# Patient Record
Sex: FEMALE | Race: WHITE | NOT HISPANIC OR LATINO
[De-identification: names, ages, dates, MRNs, and addresses within clinical notes are randomized per-mention and may not be internally consistent; named-entity substitution may affect disease eponyms.]

---

## 2017-03-17 ENCOUNTER — APPOINTMENT (OUTPATIENT)
Dept: CARDIOLOGY | Facility: CLINIC | Age: 72
End: 2017-03-17

## 2017-04-03 ENCOUNTER — APPOINTMENT (OUTPATIENT)
Dept: CARDIOLOGY | Facility: CLINIC | Age: 72
End: 2017-04-03

## 2017-04-10 ENCOUNTER — APPOINTMENT (OUTPATIENT)
Dept: CARDIOLOGY | Facility: CLINIC | Age: 72
End: 2017-04-10

## 2017-04-24 ENCOUNTER — APPOINTMENT (OUTPATIENT)
Dept: CARDIOLOGY | Facility: CLINIC | Age: 72
End: 2017-04-24

## 2017-05-08 ENCOUNTER — APPOINTMENT (OUTPATIENT)
Dept: CARDIOLOGY | Facility: CLINIC | Age: 72
End: 2017-05-08

## 2017-12-08 ENCOUNTER — RX RENEWAL (OUTPATIENT)
Age: 72
End: 2017-12-08

## 2017-12-11 ENCOUNTER — RX RENEWAL (OUTPATIENT)
Age: 72
End: 2017-12-11

## 2017-12-13 ENCOUNTER — RX RENEWAL (OUTPATIENT)
Age: 72
End: 2017-12-13

## 2017-12-28 ENCOUNTER — APPOINTMENT (OUTPATIENT)
Dept: CARDIOLOGY | Facility: CLINIC | Age: 72
End: 2017-12-28
Payer: MEDICARE

## 2017-12-28 ENCOUNTER — NON-APPOINTMENT (OUTPATIENT)
Age: 72
End: 2017-12-28

## 2017-12-28 VITALS
HEART RATE: 54 BPM | HEIGHT: 62 IN | BODY MASS INDEX: 20.06 KG/M2 | OXYGEN SATURATION: 100 % | DIASTOLIC BLOOD PRESSURE: 54 MMHG | SYSTOLIC BLOOD PRESSURE: 150 MMHG | WEIGHT: 109 LBS

## 2017-12-28 DIAGNOSIS — Z87.891 PERSONAL HISTORY OF NICOTINE DEPENDENCE: ICD-10-CM

## 2017-12-28 DIAGNOSIS — Z80.0 FAMILY HISTORY OF MALIGNANT NEOPLASM OF DIGESTIVE ORGANS: ICD-10-CM

## 2017-12-28 PROCEDURE — 99214 OFFICE O/P EST MOD 30 MIN: CPT

## 2017-12-28 PROCEDURE — 93000 ELECTROCARDIOGRAM COMPLETE: CPT

## 2018-01-03 ENCOUNTER — MEDICATION RENEWAL (OUTPATIENT)
Age: 73
End: 2018-01-03

## 2018-02-26 ENCOUNTER — RX RENEWAL (OUTPATIENT)
Age: 73
End: 2018-02-26

## 2018-03-02 ENCOUNTER — MEDICATION RENEWAL (OUTPATIENT)
Age: 73
End: 2018-03-02

## 2018-03-02 DIAGNOSIS — Z86.39 PERSONAL HISTORY OF OTHER ENDOCRINE, NUTRITIONAL AND METABOLIC DISEASE: ICD-10-CM

## 2018-04-03 ENCOUNTER — APPOINTMENT (OUTPATIENT)
Dept: CARDIOLOGY | Facility: CLINIC | Age: 73
End: 2018-04-03
Payer: MEDICARE

## 2018-04-03 PROCEDURE — 93306 TTE W/DOPPLER COMPLETE: CPT

## 2018-04-06 ENCOUNTER — RX RENEWAL (OUTPATIENT)
Age: 73
End: 2018-04-06

## 2018-04-17 ENCOUNTER — APPOINTMENT (OUTPATIENT)
Dept: CARDIOLOGY | Facility: CLINIC | Age: 73
End: 2018-04-17
Payer: MEDICARE

## 2018-04-17 VITALS
SYSTOLIC BLOOD PRESSURE: 150 MMHG | WEIGHT: 110 LBS | HEIGHT: 62 IN | HEART RATE: 78 BPM | BODY MASS INDEX: 20.24 KG/M2 | DIASTOLIC BLOOD PRESSURE: 70 MMHG

## 2018-04-17 PROCEDURE — 99214 OFFICE O/P EST MOD 30 MIN: CPT

## 2018-04-19 ENCOUNTER — RX RENEWAL (OUTPATIENT)
Age: 73
End: 2018-04-19

## 2018-05-07 ENCOUNTER — APPOINTMENT (OUTPATIENT)
Dept: CARDIOLOGY | Facility: CLINIC | Age: 73
End: 2018-05-07

## 2018-05-21 ENCOUNTER — RX RENEWAL (OUTPATIENT)
Age: 73
End: 2018-05-21

## 2018-05-29 ENCOUNTER — RX RENEWAL (OUTPATIENT)
Age: 73
End: 2018-05-29

## 2018-06-20 ENCOUNTER — RX RENEWAL (OUTPATIENT)
Age: 73
End: 2018-06-20

## 2018-08-20 ENCOUNTER — MEDICATION RENEWAL (OUTPATIENT)
Age: 73
End: 2018-08-20

## 2018-08-20 ENCOUNTER — RX RENEWAL (OUTPATIENT)
Age: 73
End: 2018-08-20

## 2018-08-27 RX ORDER — HYDROCHLOROTHIAZIDE 25 MG/1
25 TABLET ORAL DAILY
Qty: 90 | Refills: 1 | Status: DISCONTINUED | COMMUNITY
End: 2018-08-27

## 2018-10-10 ENCOUNTER — APPOINTMENT (OUTPATIENT)
Dept: CARDIOLOGY | Facility: CLINIC | Age: 73
End: 2018-10-10
Payer: MEDICARE

## 2018-10-10 PROCEDURE — 93306 TTE W/DOPPLER COMPLETE: CPT

## 2018-10-16 ENCOUNTER — APPOINTMENT (OUTPATIENT)
Dept: CARDIOLOGY | Facility: CLINIC | Age: 73
End: 2018-10-16
Payer: MEDICARE

## 2018-10-16 VITALS
HEIGHT: 62 IN | OXYGEN SATURATION: 97 % | SYSTOLIC BLOOD PRESSURE: 120 MMHG | HEART RATE: 58 BPM | DIASTOLIC BLOOD PRESSURE: 62 MMHG | BODY MASS INDEX: 20.98 KG/M2 | WEIGHT: 114 LBS

## 2018-10-16 PROCEDURE — 99214 OFFICE O/P EST MOD 30 MIN: CPT

## 2018-12-17 ENCOUNTER — RX RENEWAL (OUTPATIENT)
Age: 73
End: 2018-12-17

## 2019-02-25 ENCOUNTER — MEDICATION RENEWAL (OUTPATIENT)
Age: 74
End: 2019-02-25

## 2019-04-19 ENCOUNTER — APPOINTMENT (OUTPATIENT)
Dept: CARDIOLOGY | Facility: CLINIC | Age: 74
End: 2019-04-19
Payer: MEDICARE

## 2019-04-19 ENCOUNTER — NON-APPOINTMENT (OUTPATIENT)
Age: 74
End: 2019-04-19

## 2019-04-19 VITALS
OXYGEN SATURATION: 99 % | DIASTOLIC BLOOD PRESSURE: 60 MMHG | SYSTOLIC BLOOD PRESSURE: 132 MMHG | WEIGHT: 111 LBS | BODY MASS INDEX: 20.96 KG/M2 | HEART RATE: 55 BPM | HEIGHT: 61 IN

## 2019-04-19 PROCEDURE — 93306 TTE W/DOPPLER COMPLETE: CPT

## 2019-04-19 PROCEDURE — 99215 OFFICE O/P EST HI 40 MIN: CPT

## 2019-04-19 PROCEDURE — 93000 ELECTROCARDIOGRAM COMPLETE: CPT

## 2019-04-19 NOTE — PHYSICAL EXAM
[General Appearance - Well Developed] : well developed [Normal Appearance] : normal appearance [General Appearance - Well Nourished] : well nourished [Well Groomed] : well groomed [No Deformities] : no deformities [Normal Conjunctiva] : the conjunctiva exhibited no abnormalities [General Appearance - In No Acute Distress] : no acute distress [Eyelids - No Xanthelasma] : the eyelids demonstrated no xanthelasmas [Normal Oral Mucosa] : normal oral mucosa [No Oral Pallor] : no oral pallor [No Oral Cyanosis] : no oral cyanosis [Normal Jugular Venous A Waves Present] : normal jugular venous A waves present [Normal Jugular Venous V Waves Present] : normal jugular venous V waves present [No Jugular Venous Cronin A Waves] : no jugular venous cronin A waves [Respiration, Rhythm And Depth] : normal respiratory rhythm and effort [Auscultation Breath Sounds / Voice Sounds] : lungs were clear to auscultation bilaterally [Exaggerated Use Of Accessory Muscles For Inspiration] : no accessory muscle use [Heart Rate And Rhythm] : heart rate and rhythm were normal [Heart Sounds] : normal S1 and S2 [Murmurs] : no murmurs present [Abdomen Soft] : soft [Abdomen Mass (___ Cm)] : no abdominal mass palpated [Abdomen Tenderness] : non-tender [Gait - Sufficient For Exercise Testing] : the gait was sufficient for exercise testing [Abnormal Walk] : normal gait [Nail Clubbing] : no clubbing of the fingernails [Cyanosis, Localized] : no localized cyanosis [Petechial Hemorrhages (___cm)] : no petechial hemorrhages [] : no rash [Skin Color & Pigmentation] : normal skin color and pigmentation [No Venous Stasis] : no venous stasis [Skin Lesions] : no skin lesions [No Skin Ulcers] : no skin ulcer [No Xanthoma] : no  xanthoma was observed [Mood] : the mood was normal [Oriented To Time, Place, And Person] : oriented to person, place, and time [Affect] : the affect was normal [No Anxiety] : not feeling anxious

## 2019-04-19 NOTE — HISTORY OF PRESENT ILLNESS
[FreeTextEntry1] : Patient is presenting today for followup visit.  Patient has a history of ascending aortic aneurysm which has been followed on a regular basis and has been stable. Patient has a history of mild aortic stenosis. She was evaluated by neurology for possible TIA. She was started on low dose daily aspirin and statin. She was also prescribed antianxiety medication. She has been doing clinically very well. No further symptoms. She is physically active. No chest pains or shortness of breath.

## 2019-04-19 NOTE — ASSESSMENT
[FreeTextEntry1] : Ascending aortic aneurysm 4.8 cm. Echo done today, results of echocardiogram discussed. Aortic aneurysm measuring 4.7 cm. Bicuspid aortic valve with likely progression of aortic insufficiency estimated to be severe. Patient denies any symptoms. She is coughing on a regular basis. No shortness of breath. I suggest that we should do transesophageal echocardiogram for accurate estimation of aortic insufficiency and size of the ascending aorta. I also advised that she will meet with cardiothoracic surgeon in anticipation of aneurysm repair and aortic valve replacement in the near future. Considering her body size and bicuspid aortic valve she likely will need aneurysm repair sooner than later. Appointment with Dr. Herrera will be made for consultation regarding aortic insufficiency and ascending aortic aneurysm.\par History TIA. Patient was started on baby aspirin by neurology. She had no further neurological symptoms. Patient was likely dehydrated. She is not hydrating enough. Increase hydration discussed with the patient\par Hypertension well-controlled\par FU after RYDER

## 2019-04-19 NOTE — REASON FOR VISIT
[Follow-Up - Clinic] : a clinic follow-up of [Hypertension] : hypertension [FreeTextEntry1] : aneurysm

## 2019-05-08 ENCOUNTER — OUTPATIENT (OUTPATIENT)
Dept: OUTPATIENT SERVICES | Facility: HOSPITAL | Age: 74
LOS: 1 days | End: 2019-05-08
Payer: MEDICARE

## 2019-05-08 PROCEDURE — 93312 ECHO TRANSESOPHAGEAL: CPT | Mod: 26

## 2019-05-08 PROCEDURE — 93320 DOPPLER ECHO COMPLETE: CPT | Mod: 26

## 2019-05-28 ENCOUNTER — RX RENEWAL (OUTPATIENT)
Age: 74
End: 2019-05-28

## 2019-06-17 ENCOUNTER — RX RENEWAL (OUTPATIENT)
Age: 74
End: 2019-06-17

## 2019-06-24 ENCOUNTER — APPOINTMENT (OUTPATIENT)
Dept: CARDIOTHORACIC SURGERY | Facility: CLINIC | Age: 74
End: 2019-06-24
Payer: MEDICARE

## 2019-06-24 VITALS
HEART RATE: 58 BPM | SYSTOLIC BLOOD PRESSURE: 150 MMHG | OXYGEN SATURATION: 98 % | DIASTOLIC BLOOD PRESSURE: 70 MMHG | BODY MASS INDEX: 20.61 KG/M2 | HEIGHT: 62 IN | WEIGHT: 112 LBS

## 2019-06-24 PROCEDURE — 99205 OFFICE O/P NEW HI 60 MIN: CPT

## 2019-06-24 NOTE — ASSESSMENT
[FreeTextEntry1] : Ms. Mcrae is a 73 year old female with severe symptomatic aortic insufficiency. she will need a CT scan of the chest as well a left heart cath with aortic root injection. I discussed with her Bental procedure and valve replacement and will meet with her after the testing to further discuss. \par \par \par I thank you for the opportunity to participate in the care of your patients. Please do not hesitate to contact me should you have any questions.\par

## 2019-06-24 NOTE — HISTORY OF PRESENT ILLNESS
[FreeTextEntry1] : Ms. CASPER is a 73 year old female referred by Dr. Melendez who presents for consultation. Her past medical history includes ascending aortic aneurysm (4.8-5 cm), HTN, HLD, mitral insufficiency, and severe aortic insufficiency.\par \par A RYDER was obtained at [facility] on May 8, 2019 demonstrating a bicuspid aortic valve ascending aorta measures 4.7-4.8 cm, LVEF 60%.\par \par \par

## 2019-06-24 NOTE — PHYSICAL EXAM
[General Appearance - Alert] : alert [General Appearance - In No Acute Distress] : in no acute distress [Sclera] : the sclera and conjunctiva were normal [Outer Ear] : the ears and nose were normal in appearance [Neck Appearance] : the appearance of the neck was normal [] : no respiratory distress [Exaggerated Use Of Accessory Muscles For Inspiration] : no accessory muscle use [Apical Impulse] : the apical impulse was normal [Heart Sounds] : normal S1 and S2 [FreeTextEntry1] : IV/VI systolic murmur at the right second intercostal space [Examination Of The Chest] : the chest was normal in appearance [Arterial Pulses Carotid] : carotid pulses were normal with no bruits [Arterial Pulses Femoral] : femoral pulses were normal without bruits [Abdomen Soft] : soft [Bowel Sounds] : normal bowel sounds [Cervical Lymph Nodes Enlarged Posterior Bilaterally] : posterior cervical [No CVA Tenderness] : no ~M costovertebral angle tenderness [Nail Clubbing] : no clubbing  or cyanosis of the fingernails [Abnormal Walk] : normal gait [Skin Color & Pigmentation] : normal skin color and pigmentation [Cranial Nerves] : cranial nerves 2-12 were intact [Sensation] : the sensory exam was normal to light touch and pinprick [Oriented To Time, Place, And Person] : oriented to person, place, and time [Affect] : the affect was normal

## 2019-06-28 ENCOUNTER — APPOINTMENT (OUTPATIENT)
Dept: CARDIOLOGY | Facility: CLINIC | Age: 74
End: 2019-06-28

## 2019-07-19 ENCOUNTER — OUTPATIENT (OUTPATIENT)
Dept: OUTPATIENT SERVICES | Facility: HOSPITAL | Age: 74
LOS: 1 days | End: 2019-07-19
Payer: MEDICARE

## 2019-07-19 VITALS
TEMPERATURE: 98 F | DIASTOLIC BLOOD PRESSURE: 77 MMHG | HEART RATE: 57 BPM | WEIGHT: 111.99 LBS | RESPIRATION RATE: 18 BRPM | HEIGHT: 62 IN | SYSTOLIC BLOOD PRESSURE: 188 MMHG | OXYGEN SATURATION: 98 %

## 2019-07-19 VITALS — WEIGHT: 111.99 LBS | HEIGHT: 62 IN

## 2019-07-19 DIAGNOSIS — Z98.890 OTHER SPECIFIED POSTPROCEDURAL STATES: Chronic | ICD-10-CM

## 2019-07-19 DIAGNOSIS — Z01.810 ENCOUNTER FOR PREPROCEDURAL CARDIOVASCULAR EXAMINATION: ICD-10-CM

## 2019-07-19 LAB
ANION GAP SERPL CALC-SCNC: 10 MMOL/L — SIGNIFICANT CHANGE UP (ref 5–17)
APTT BLD: 32.7 SEC — SIGNIFICANT CHANGE UP (ref 27.5–36.3)
BASOPHILS # BLD AUTO: 0.03 K/UL — SIGNIFICANT CHANGE UP (ref 0–0.2)
BASOPHILS NFR BLD AUTO: 0.6 % — SIGNIFICANT CHANGE UP (ref 0–2)
BUN SERPL-MCNC: 22 MG/DL — HIGH (ref 8–20)
CALCIUM SERPL-MCNC: 9.8 MG/DL — SIGNIFICANT CHANGE UP (ref 8.6–10.2)
CHLORIDE SERPL-SCNC: 104 MMOL/L — SIGNIFICANT CHANGE UP (ref 98–107)
CO2 SERPL-SCNC: 29 MMOL/L — SIGNIFICANT CHANGE UP (ref 22–29)
CREAT SERPL-MCNC: 0.69 MG/DL — SIGNIFICANT CHANGE UP (ref 0.5–1.3)
EOSINOPHIL # BLD AUTO: 0.05 K/UL — SIGNIFICANT CHANGE UP (ref 0–0.5)
EOSINOPHIL NFR BLD AUTO: 1 % — SIGNIFICANT CHANGE UP (ref 0–6)
GLUCOSE SERPL-MCNC: 88 MG/DL — SIGNIFICANT CHANGE UP (ref 70–115)
HCT VFR BLD CALC: 36.1 % — SIGNIFICANT CHANGE UP (ref 34.5–45)
HGB BLD-MCNC: 12 G/DL — SIGNIFICANT CHANGE UP (ref 11.5–15.5)
IMM GRANULOCYTES NFR BLD AUTO: 0.2 % — SIGNIFICANT CHANGE UP (ref 0–1.5)
INR BLD: 0.97 RATIO — SIGNIFICANT CHANGE UP (ref 0.88–1.16)
LYMPHOCYTES # BLD AUTO: 1.3 K/UL — SIGNIFICANT CHANGE UP (ref 1–3.3)
LYMPHOCYTES # BLD AUTO: 25.3 % — SIGNIFICANT CHANGE UP (ref 13–44)
MCHC RBC-ENTMCNC: 30.7 PG — SIGNIFICANT CHANGE UP (ref 27–34)
MCHC RBC-ENTMCNC: 33.2 GM/DL — SIGNIFICANT CHANGE UP (ref 32–36)
MCV RBC AUTO: 92.3 FL — SIGNIFICANT CHANGE UP (ref 80–100)
MONOCYTES # BLD AUTO: 0.52 K/UL — SIGNIFICANT CHANGE UP (ref 0–0.9)
MONOCYTES NFR BLD AUTO: 10.1 % — SIGNIFICANT CHANGE UP (ref 2–14)
NEUTROPHILS # BLD AUTO: 3.23 K/UL — SIGNIFICANT CHANGE UP (ref 1.8–7.4)
NEUTROPHILS NFR BLD AUTO: 62.8 % — SIGNIFICANT CHANGE UP (ref 43–77)
PLATELET # BLD AUTO: 222 K/UL — SIGNIFICANT CHANGE UP (ref 150–400)
POTASSIUM SERPL-MCNC: 4.1 MMOL/L — SIGNIFICANT CHANGE UP (ref 3.5–5.3)
POTASSIUM SERPL-SCNC: 4.1 MMOL/L — SIGNIFICANT CHANGE UP (ref 3.5–5.3)
PROTHROM AB SERPL-ACNC: 11.2 SEC — SIGNIFICANT CHANGE UP (ref 10–12.9)
RBC # BLD: 3.91 M/UL — SIGNIFICANT CHANGE UP (ref 3.8–5.2)
RBC # FLD: 12.3 % — SIGNIFICANT CHANGE UP (ref 10.3–14.5)
SODIUM SERPL-SCNC: 143 MMOL/L — SIGNIFICANT CHANGE UP (ref 135–145)
WBC # BLD: 5.14 K/UL — SIGNIFICANT CHANGE UP (ref 3.8–10.5)
WBC # FLD AUTO: 5.14 K/UL — SIGNIFICANT CHANGE UP (ref 3.8–10.5)

## 2019-07-19 PROCEDURE — 80048 BASIC METABOLIC PNL TOTAL CA: CPT

## 2019-07-19 PROCEDURE — 36415 COLL VENOUS BLD VENIPUNCTURE: CPT

## 2019-07-19 PROCEDURE — 93005 ELECTROCARDIOGRAM TRACING: CPT

## 2019-07-19 PROCEDURE — 85610 PROTHROMBIN TIME: CPT

## 2019-07-19 PROCEDURE — 93010 ELECTROCARDIOGRAM REPORT: CPT

## 2019-07-19 PROCEDURE — G0463: CPT

## 2019-07-19 PROCEDURE — 85027 COMPLETE CBC AUTOMATED: CPT

## 2019-07-19 PROCEDURE — 85730 THROMBOPLASTIN TIME PARTIAL: CPT

## 2019-07-19 NOTE — H&P PST ADULT - ASSESSMENT
73 year old female with history of thoracic aneurysm, and bicuspid aortic valve for C in anticipation of having CVS.     	  Plan: PRE-PROCEDURE ASSESSMENT    -Patient seen and examined  -Labs reviewed  -Pre-procedure teaching completed with patient   -Questions answered about patients concerns     -instructed to NPO after midnight.   - Pt instructed to have escort to and from procedure   -pt instructed IF STENT PLACED WILL REQUIRE TO STAY OVERNIGHT FOR MONITORING AND DISCHARGED IN THE AM .

## 2019-07-19 NOTE — ASU PATIENT PROFILE, ADULT - PMH
Aortic aneurysm    Hyperlipidemia    Hypertension    Hypothyroidism    Nonrheumatic aortic valve insufficiency    Nonrheumatic mitral (valve) insufficiency

## 2019-07-19 NOTE — H&P PST ADULT - HISTORY OF PRESENT ILLNESS
This is a 73 year old female with longstanding relationship with Dr Zamora . She has been being followed for thoracic aneurysm as well as Bicuspid Aortic valve. She states she has been free of symptoms and remains active playing 18 holes of walking golf 1-2 times a week.  upon routine office visit her Echo revealed worsening of the aortic root as well as gradient. AORTIC ROOT 3.7CM, DILATED ASCENDING AORTA 4.7CM, TRANSAORTIC GRADIENT 29MMHG PEAK. ASCENDING AORTA IS 4.8CM .  She was referred to Dr Herrera for consultation for repair of valve as well as aneurysm.    she presents today for [facility] in anticipation for CVS.      mallampati 2   asa 2  BRA 0.9

## 2019-07-19 NOTE — H&P PST ADULT - NSICDXPASTMEDICALHX_GEN_ALL_CORE_FT
PAST MEDICAL HISTORY:  Aortic aneurysm     Hyperlipidemia     Hypertension     Hypothyroidism     Nonrheumatic aortic valve insufficiency     Nonrheumatic mitral (valve) insufficiency

## 2019-07-23 PROBLEM — I34.0 NONRHEUMATIC MITRAL (VALVE) INSUFFICIENCY: Chronic | Status: ACTIVE | Noted: 2019-07-19

## 2019-07-23 PROBLEM — I35.1 NONRHEUMATIC AORTIC (VALVE) INSUFFICIENCY: Chronic | Status: ACTIVE | Noted: 2019-07-19

## 2019-07-23 PROBLEM — E78.5 HYPERLIPIDEMIA, UNSPECIFIED: Chronic | Status: ACTIVE | Noted: 2019-07-19

## 2019-07-23 PROBLEM — I71.9 AORTIC ANEURYSM OF UNSPECIFIED SITE, WITHOUT RUPTURE: Chronic | Status: ACTIVE | Noted: 2019-07-19

## 2019-07-23 PROBLEM — E03.9 HYPOTHYROIDISM, UNSPECIFIED: Chronic | Status: ACTIVE | Noted: 2019-07-19

## 2019-07-23 PROBLEM — I10 ESSENTIAL (PRIMARY) HYPERTENSION: Chronic | Status: ACTIVE | Noted: 2019-07-19

## 2019-07-26 ENCOUNTER — TRANSCRIPTION ENCOUNTER (OUTPATIENT)
Age: 74
End: 2019-07-26

## 2019-07-26 ENCOUNTER — OUTPATIENT (OUTPATIENT)
Dept: OUTPATIENT SERVICES | Facility: HOSPITAL | Age: 74
LOS: 1 days | Discharge: HOME | End: 2019-07-26
Payer: MEDICARE

## 2019-07-26 VITALS
HEART RATE: 54 BPM | HEIGHT: 63 IN | TEMPERATURE: 98 F | RESPIRATION RATE: 16 BRPM | OXYGEN SATURATION: 98 % | DIASTOLIC BLOOD PRESSURE: 78 MMHG | SYSTOLIC BLOOD PRESSURE: 175 MMHG | WEIGHT: 111.99 LBS

## 2019-07-26 VITALS
RESPIRATION RATE: 16 BRPM | OXYGEN SATURATION: 97 % | SYSTOLIC BLOOD PRESSURE: 151 MMHG | DIASTOLIC BLOOD PRESSURE: 77 MMHG | HEART RATE: 52 BPM

## 2019-07-26 DIAGNOSIS — I71.9 AORTIC ANEURYSM OF UNSPECIFIED SITE, WITHOUT RUPTURE: ICD-10-CM

## 2019-07-26 DIAGNOSIS — Z98.890 OTHER SPECIFIED POSTPROCEDURAL STATES: Chronic | ICD-10-CM

## 2019-07-26 PROCEDURE — 93454 CORONARY ARTERY ANGIO S&I: CPT | Mod: 26

## 2019-07-26 PROCEDURE — 99153 MOD SED SAME PHYS/QHP EA: CPT

## 2019-07-26 PROCEDURE — 93567 NJX CAR CTH SPRVLV AORTGRPHY: CPT

## 2019-07-26 PROCEDURE — 93454 CORONARY ARTERY ANGIO S&I: CPT

## 2019-07-26 PROCEDURE — 99152 MOD SED SAME PHYS/QHP 5/>YRS: CPT

## 2019-07-26 PROCEDURE — C1769: CPT

## 2019-07-26 PROCEDURE — C1894: CPT

## 2019-07-26 PROCEDURE — C1887: CPT

## 2019-07-26 NOTE — DISCHARGE NOTE PROVIDER - HOSPITAL COURSE
Department of Cardiology                                                                    [facility]/[place] [address] [address]                                                              Telephone: [phone]. Fax:[phone]                                                                               Cardiac Catheterization Note             Narrative:    73y  Female is now s/p left heart catheterization via non obstructive CAD Severe AI and ascending aorta aneurysm 4.7 .                PAST MEDICAL & SURGICAL HISTORY:    Hypothyroidism    Nonrheumatic mitral (valve) insufficiency    Nonrheumatic aortic valve insufficiency    Hyperlipidemia    Hypertension    Aortic aneurysm    S/P hernia repair        FAMILY HISTORY:        Home Medications:    amLODIPine 5 mg oral tablet: 1 tab(s) orally once a day (26 Jul 2019 07:20)    aspirin 81 mg oral tablet: 1 tab(s) orally once a day (26 Jul 2019 07:20)    hydroCHLOROthiazide 25 mg oral tablet: orally every other day (26 Jul 2019 07:20)    levothyroxine 75 mcg (0.075 mg) oral tablet: 1 tab(s) orally once a day (26 Jul 2019 07:20)    Lexapro 10 mg oral tablet: 1 tab(s) orally once a day (26 Jul 2019 07:20)    quinapril 20 mg oral tablet: 1 tab(s) orally once a day (26 Jul 2019 07:20)    raNITIdine 150 mg oral capsule: 1 cap(s) orally 2 times a day (26 Jul 2019 07:20)    simvastatin 20 mg oral tablet: 1 tab(s) orally once a day (at bedtime) (26 Jul 2019 07:20)                            General: No fatigue, no fevers/chills    Respiratory: No dyspnea, no cough, no wheeze    CV: No chest pain, no palpitations    Abd: No nausea    Neuro: No headache, no dizziness    codeine (Vomiting)    No Known Allergies            Objective:    Vital Signs Last 24 Hrs    T(C): 36.5 (26 Jul 2019 07:37), Max: 36.5 (26 Jul 2019 07:37)    T(F): 97.7 (26 Jul 2019 07:37), Max: 97.7 (26 Jul 2019 07:37)    HR: 58 (26 Jul 2019 10:45) (52 - 58)    BP: 156/70 (26 Jul 2019 10:45) (144/67 - 185/77)    BP(mean): --    RR: 16 (26 Jul 2019 10:45) (15 - 16)    SpO2: 98% (26 Jul 2019 10:45) (95% - 98%)        CM: SR    Neuro: A&OX3, CN 2-12 intact    HEENT: NC, AT    Lungs: CTA B/L    CV: S1, S2, no murmur, RRR    Abd: Soft    Right radial : Soft, no bleeding, no hematoma    Extremity: + distal pulses                            PLAN     -post cardiac cath discharge orders    -radial site precautions.     -continue current medical therapy    -follow up with Dr Herrera.     -follow up outpt in 2 weeks

## 2019-07-26 NOTE — PROGRESS NOTE ADULT - SUBJECTIVE AND OBJECTIVE BOX
Department of Cardiology                                                                  [facility]/[place] [address] [id number]                                                            Telephone: [phone]. Fax:[phone]    Pre Cath Note    73y Female     Planned Procedure: [facility]    Pertinent Prior Medications:        Antiplatelet: N/A       Aspirin: 81 mg daily       Statin: Zocor 20 mg daily       Beta Blocker: N/A       CCB: Amlodipine 5 mg daily       Other Antianginal: N/A       ACEI: Quinapril 20 mg daily       Diuretic: HCTZ 25 mg daily    Pre-Procedural Orders: N/A    ASA: 2  Mallampati: 2  CCS Class: 1  GFR: 88  Adjusted Bleeding Risk: 0.9%    HPI: This is a 73 year old female with longstanding relationship with Dr Zamora . She has been being followed for thoracic aneurysm as well as Bicuspid Aortic valve. She states she has been free of symptoms and remains active playing 18 holes of walking golf 1-2 times a week.  upon routine office visit her Echo revealed worsening of the aortic root as well as gradient. AORTIC ROOT 3.7CM, DILATED ASCENDING AORTA 4.7CM, TRANSAORTIC GRADIENT 29MMHG PEAK. ASCENDING AORTA IS 4.8CM .  She was referred to Dr Herrera for consultation for repair of valve as well as aneurysm.  She presents today for [facility] in anticipation for CVS.     Nuclear Stress Test: N/A    RYDER: 5/8/2019       LVSF: Normal       EF: 60%       RVSF: Normal       LA: Normal       RA: Normal, PFO with no shunt       Mitral Valve: Mild MR       Aortic Valve: Bicuspid valve with moderate eccentric AR       Aorta: Dilated ascending aorta (4.7-4.8 cm)       Tricuspid Valve: Mild TR       Pulmonic Valve: Normal       Pericardium: No effusion    Allergies: No Known Allergies  Intolerances: codeine (Vomiting)    PAST MEDICAL & SURGICAL HISTORY:  Hypothyroidism  Nonrheumatic mitral (valve) insufficiency  Nonrheumatic aortic valve insufficiency  Hyperlipidemia  Hypertension  Aortic aneurysm  S/P hernia repair    Home Medications:  amLODIPine 5 mg oral tablet: 1 tab(s) orally once a day (26 Jul 2019 07:20)  aspirin 81 mg oral tablet: 1 tab(s) orally once a day (26 Jul 2019 07:20)  hydroCHLOROthiazide 25 mg oral tablet: orally every other day (26 Jul 2019 07:20)  levothyroxine 75 mcg (0.075 mg) oral tablet: 1 tab(s) orally once a day (26 Jul 2019 07:20)  Lexapro 10 mg oral tablet: 1 tab(s) orally once a day (26 Jul 2019 07:20)  quinapril 20 mg oral tablet: 1 tab(s) orally once a day (26 Jul 2019 07:20)  raNITIdine 150 mg oral capsule: 1 cap(s) orally 2 times a day (26 Jul 2019 07:20)  simvastatin 20 mg oral tablet: 1 tab(s) orally once a day (at bedtime) (26 Jul 2019 07:20)    Physical Examination:   General: Awake, alert, speech clear, no acute distress.  Neck: No bruit, normal jugular venous pressures  Chest: Clear CTA, S1, S2, no murmur, RRR  Extremities: No edema

## 2019-07-26 NOTE — DISCHARGE NOTE NURSING/CASE MANAGEMENT/SOCIAL WORK - NSDCDPATPORTLINK_GEN_ALL_CORE
You can access the [facility] Patient Portal, offered by [facility], by registering with the following website: [email or web address]

## 2019-07-26 NOTE — DISCHARGE NOTE PROVIDER - NSDCCPTREATMENT_GEN_ALL_CORE_FT
PRINCIPAL PROCEDURE  Procedure: Angiogram, coronary, with heart valve assessment  Findings and Treatment:

## 2019-07-26 NOTE — DISCHARGE NOTE PROVIDER - CARE PROVIDER_API CALL
Deng Herrera)  Surgery; Thoracic and Cardiac Surgery  [address] [place] [address]  Phone: [phone]  Fax: [phone]  Follow Up Time:

## 2019-08-02 ENCOUNTER — APPOINTMENT (OUTPATIENT)
Dept: CARDIOLOGY | Facility: CLINIC | Age: 74
End: 2019-08-02
Payer: MEDICARE

## 2019-08-02 VITALS
HEART RATE: 55 BPM | DIASTOLIC BLOOD PRESSURE: 62 MMHG | SYSTOLIC BLOOD PRESSURE: 134 MMHG | WEIGHT: 112 LBS | HEIGHT: 62 IN | OXYGEN SATURATION: 97 % | BODY MASS INDEX: 20.61 KG/M2

## 2019-08-02 PROCEDURE — 99215 OFFICE O/P EST HI 40 MIN: CPT

## 2019-08-02 RX ORDER — ESCITALOPRAM OXALATE 10 MG/1
10 TABLET, FILM COATED ORAL DAILY
Refills: 0 | Status: ACTIVE | COMMUNITY

## 2019-08-02 RX ORDER — LATANOPROST/PF 0.005 %
0.01 DROPS OPHTHALMIC (EYE)
Refills: 0 | Status: ACTIVE | COMMUNITY

## 2019-08-02 RX ORDER — LEVOTHYROXINE SODIUM 0.07 MG/1
75 TABLET ORAL
Refills: 0 | Status: ACTIVE | COMMUNITY

## 2019-08-02 NOTE — PHYSICAL EXAM
[General Appearance - Well Developed] : well developed [Well Groomed] : well groomed [Normal Appearance] : normal appearance [No Deformities] : no deformities [General Appearance - Well Nourished] : well nourished [General Appearance - In No Acute Distress] : no acute distress [Normal Conjunctiva] : the conjunctiva exhibited no abnormalities [Eyelids - No Xanthelasma] : the eyelids demonstrated no xanthelasmas [Normal Oral Mucosa] : normal oral mucosa [No Oral Pallor] : no oral pallor [No Oral Cyanosis] : no oral cyanosis [Normal Jugular Venous V Waves Present] : normal jugular venous V waves present [Normal Jugular Venous A Waves Present] : normal jugular venous A waves present [No Jugular Venous Cronin A Waves] : no jugular venous cronin A waves [Exaggerated Use Of Accessory Muscles For Inspiration] : no accessory muscle use [Respiration, Rhythm And Depth] : normal respiratory rhythm and effort [Auscultation Breath Sounds / Voice Sounds] : lungs were clear to auscultation bilaterally [Heart Rate And Rhythm] : heart rate and rhythm were normal [Heart Sounds] : normal S1 and S2 [Abdomen Soft] : soft [Murmurs] : no murmurs present [Abdomen Tenderness] : non-tender [Abnormal Walk] : normal gait [Abdomen Mass (___ Cm)] : no abdominal mass palpated [Gait - Sufficient For Exercise Testing] : the gait was sufficient for exercise testing [Nail Clubbing] : no clubbing of the fingernails [Petechial Hemorrhages (___cm)] : no petechial hemorrhages [Cyanosis, Localized] : no localized cyanosis [Skin Color & Pigmentation] : normal skin color and pigmentation [] : no rash [No Venous Stasis] : no venous stasis [Skin Lesions] : no skin lesions [No Skin Ulcers] : no skin ulcer [No Xanthoma] : no  xanthoma was observed [Oriented To Time, Place, And Person] : oriented to person, place, and time [Affect] : the affect was normal [No Anxiety] : not feeling anxious [Mood] : the mood was normal

## 2019-08-02 NOTE — ASSESSMENT
[FreeTextEntry1] : Ascending aortic aneurysm 4.8 cm. Echo done today, results of echocardiogram discussed. Aortic aneurysm measuring 4.7 cm. Bicuspid aortic valve with likely progression of aortic insufficiency estimated to be severe. Patient denies any symptoms. She is coughing on a regular basis. No shortness of breath. \par History TIA. Patient was started on baby aspirin by neurology. She had no further neurological symptoms. Patient was likely dehydrated. She is not hydrating enough. Increase hydration discussed with the patient\par Hypertension well-controlled. Chronic cough likely secondary to quinapril. We will hold quinapril and increase dose of amlodipine. Blood pressure recheck in a week.\par Patient was evaluated by cardiothoracic surgery. She had a cardiac catheterization done which revealed nonobstructive CAD, severe aortic insufficiency. Patient is scheduled to followup with cardiothoracic surgery next week in preparation for surgery.\par

## 2019-08-05 DIAGNOSIS — I35.1 NONRHEUMATIC AORTIC (VALVE) INSUFFICIENCY: ICD-10-CM

## 2019-08-06 ENCOUNTER — APPOINTMENT (OUTPATIENT)
Dept: CARDIOTHORACIC SURGERY | Facility: CLINIC | Age: 74
End: 2019-08-06
Payer: MEDICARE

## 2019-08-06 VITALS
HEIGHT: 62 IN | BODY MASS INDEX: 20.61 KG/M2 | WEIGHT: 112 LBS | OXYGEN SATURATION: 96 % | RESPIRATION RATE: 16 BRPM | DIASTOLIC BLOOD PRESSURE: 67 MMHG | HEART RATE: 66 BPM | SYSTOLIC BLOOD PRESSURE: 170 MMHG

## 2019-08-06 PROCEDURE — 99213 OFFICE O/P EST LOW 20 MIN: CPT

## 2019-08-06 RX ORDER — QUINAPRIL HYDROCHLORIDE 20 MG/1
20 TABLET, FILM COATED ORAL
Qty: 90 | Refills: 0 | Status: COMPLETED | COMMUNITY
Start: 2017-12-13 | End: 2019-08-06

## 2019-08-06 NOTE — PHYSICAL EXAM
[Sclera] : the sclera and conjunctiva were normal [Neck Appearance] : the appearance of the neck was normal [Exaggerated Use Of Accessory Muscles For Inspiration] : no accessory muscle use [Apical Impulse] : the apical impulse was normal [FreeTextEntry1] : IV/VI systolic murmur at the apex [Examination Of The Chest] : the chest was normal in appearance [Heart Sounds] : normal S1 and S2 [Bowel Sounds] : normal bowel sounds [Arterial Pulses Carotid] : carotid pulses were normal with no bruits [Arterial Pulses Femoral] : femoral pulses were normal without bruits [Abdomen Soft] : soft [Cervical Lymph Nodes Enlarged Posterior Bilaterally] : posterior cervical [Abnormal Walk] : normal gait [Nail Clubbing] : no clubbing  or cyanosis of the fingernails [No CVA Tenderness] : no ~M costovertebral angle tenderness [Skin Color & Pigmentation] : normal skin color and pigmentation [] : no rash [Cranial Nerves] : cranial nerves 2-12 were intact [Oriented To Time, Place, And Person] : oriented to person, place, and time [Sensation] : the sensory exam was normal to light touch and pinprick [Affect] : the affect was normal

## 2019-08-06 NOTE — ASSESSMENT
[FreeTextEntry1] : Ms. Mcrae is a 73 year old female with severe aortic insufficiency and aortic aneurysm of 4.7 cm with bicuspid aortic valve who is a candidate for Bentall procedure.  I explained the risks, benefits, and alternatives of surgery to the patient and family. They understand and agree to proceed. I thank you for the opportunity to participate in the care of your patients. Please do not hesitate to contact me should you have any questions.\par

## 2019-08-06 NOTE — CONSULT LETTER
[Dear  ___] : Dear  [unfilled], [Courtesy Letter:] : I had the pleasure of seeing your patient, [unfilled], in my office today. [Sincerely,] : Sincerely, [Consult Closing:] : Thank you very much for allowing me to participate in the care of this patient.  If you have any questions, please do not hesitate to contact me. [FreeTextEntry2] : Farrah Melendez MD [FreeTextEntry3] : Deng Herrera MD\par  of Cardiothoracic Surgery\par [facility]\par [address] [place] [address] \[place] [address]\par [phone]\par

## 2019-08-06 NOTE — HISTORY OF PRESENT ILLNESS
[FreeTextEntry1] : Ms. CASPER is a 73 year old female referred by Dr. Melendez who presents back to the office having obtained additional imaging requested. Her past medical history includes ascending aortic aneurysm (4.8 cm), HTN, HLD, and severe aortic insufficiency.\par \par Cardiac Cath on 8/1/19 revealed non-obstructive CAD with a 4.7cm ascending aortic aneurysm.\par \par CT Scan was performed on 7/1/19 demonstrating 4.4 cm at main pulmonary artery, 2.4 cm aortic annulus, 3.7 cm at Sinus of Valsalva.\par \par She has leg edema and NYHA class II heart failure with bicuspid aortic valve. \par \par \par

## 2019-08-06 NOTE — REVIEW OF SYSTEMS
[Lower Ext Edema] : lower extremity edema [Negative] : Heme/Lymph [Chest Pain] : no chest pain [Palpitations] : no palpitations [SOB on Exertion] : shortness of breath during exertion

## 2019-08-19 ENCOUNTER — RX RENEWAL (OUTPATIENT)
Age: 74
End: 2019-08-19

## 2019-08-27 ENCOUNTER — RX RENEWAL (OUTPATIENT)
Age: 74
End: 2019-08-27

## 2019-10-15 ENCOUNTER — APPOINTMENT (OUTPATIENT)
Dept: PULMONOLOGY | Facility: CLINIC | Age: 74
End: 2019-10-15
Payer: MEDICARE

## 2019-10-15 ENCOUNTER — OUTPATIENT (OUTPATIENT)
Dept: OUTPATIENT SERVICES | Facility: HOSPITAL | Age: 74
LOS: 1 days | End: 2019-10-15
Payer: MEDICARE

## 2019-10-15 VITALS
HEIGHT: 59 IN | DIASTOLIC BLOOD PRESSURE: 73 MMHG | WEIGHT: 114.64 LBS | RESPIRATION RATE: 18 BRPM | HEART RATE: 57 BPM | TEMPERATURE: 97 F | SYSTOLIC BLOOD PRESSURE: 161 MMHG

## 2019-10-15 DIAGNOSIS — Z98.890 OTHER SPECIFIED POSTPROCEDURAL STATES: Chronic | ICD-10-CM

## 2019-10-15 DIAGNOSIS — I35.0 NONRHEUMATIC AORTIC (VALVE) STENOSIS: ICD-10-CM

## 2019-10-15 DIAGNOSIS — Z29.9 ENCOUNTER FOR PROPHYLACTIC MEASURES, UNSPECIFIED: ICD-10-CM

## 2019-10-15 DIAGNOSIS — Z01.818 ENCOUNTER FOR OTHER PREPROCEDURAL EXAMINATION: ICD-10-CM

## 2019-10-15 DIAGNOSIS — I71.2 THORACIC AORTIC ANEURYSM, WITHOUT RUPTURE: ICD-10-CM

## 2019-10-15 LAB
ALBUMIN SERPL ELPH-MCNC: 4.4 G/DL — SIGNIFICANT CHANGE UP (ref 3.3–5.2)
ALP SERPL-CCNC: 80 U/L — SIGNIFICANT CHANGE UP (ref 40–120)
ALT FLD-CCNC: 18 U/L — SIGNIFICANT CHANGE UP
ANION GAP SERPL CALC-SCNC: 12 MMOL/L — SIGNIFICANT CHANGE UP (ref 5–17)
APPEARANCE UR: CLEAR — SIGNIFICANT CHANGE UP
APTT BLD: 33.3 SEC — SIGNIFICANT CHANGE UP (ref 27.5–36.3)
AST SERPL-CCNC: 32 U/L — HIGH
BASOPHILS # BLD AUTO: 0.04 K/UL — SIGNIFICANT CHANGE UP (ref 0–0.2)
BASOPHILS NFR BLD AUTO: 0.9 % — SIGNIFICANT CHANGE UP (ref 0–2)
BILIRUB SERPL-MCNC: 0.6 MG/DL — SIGNIFICANT CHANGE UP (ref 0.4–2)
BILIRUB UR-MCNC: NEGATIVE — SIGNIFICANT CHANGE UP
BLD GP AB SCN SERPL QL: SIGNIFICANT CHANGE UP
BUN SERPL-MCNC: 23 MG/DL — HIGH (ref 8–20)
CALCIUM SERPL-MCNC: 9 MG/DL — SIGNIFICANT CHANGE UP (ref 8.6–10.2)
CHLORIDE SERPL-SCNC: 101 MMOL/L — SIGNIFICANT CHANGE UP (ref 98–107)
CO2 SERPL-SCNC: 29 MMOL/L — SIGNIFICANT CHANGE UP (ref 22–29)
COLOR SPEC: YELLOW — SIGNIFICANT CHANGE UP
CREAT SERPL-MCNC: 0.57 MG/DL — SIGNIFICANT CHANGE UP (ref 0.5–1.3)
DIFF PNL FLD: NEGATIVE — SIGNIFICANT CHANGE UP
EOSINOPHIL # BLD AUTO: 0.07 K/UL — SIGNIFICANT CHANGE UP (ref 0–0.5)
EOSINOPHIL NFR BLD AUTO: 1.6 % — SIGNIFICANT CHANGE UP (ref 0–6)
GLUCOSE SERPL-MCNC: 88 MG/DL — SIGNIFICANT CHANGE UP (ref 70–115)
GLUCOSE UR QL: NEGATIVE MG/DL — SIGNIFICANT CHANGE UP
HBA1C BLD-MCNC: 5.2 % — SIGNIFICANT CHANGE UP (ref 4–5.6)
HCT VFR BLD CALC: 37.2 % — SIGNIFICANT CHANGE UP (ref 34.5–45)
HGB BLD-MCNC: 12.2 G/DL — SIGNIFICANT CHANGE UP (ref 11.5–15.5)
IMM GRANULOCYTES NFR BLD AUTO: 0.2 % — SIGNIFICANT CHANGE UP (ref 0–1.5)
INR BLD: 0.97 RATIO — SIGNIFICANT CHANGE UP (ref 0.88–1.16)
KETONES UR-MCNC: NEGATIVE — SIGNIFICANT CHANGE UP
LEUKOCYTE ESTERASE UR-ACNC: NEGATIVE — SIGNIFICANT CHANGE UP
LYMPHOCYTES # BLD AUTO: 1.43 K/UL — SIGNIFICANT CHANGE UP (ref 1–3.3)
LYMPHOCYTES # BLD AUTO: 32.2 % — SIGNIFICANT CHANGE UP (ref 13–44)
MCHC RBC-ENTMCNC: 31 PG — SIGNIFICANT CHANGE UP (ref 27–34)
MCHC RBC-ENTMCNC: 32.8 GM/DL — SIGNIFICANT CHANGE UP (ref 32–36)
MCV RBC AUTO: 94.7 FL — SIGNIFICANT CHANGE UP (ref 80–100)
MONOCYTES # BLD AUTO: 0.46 K/UL — SIGNIFICANT CHANGE UP (ref 0–0.9)
MONOCYTES NFR BLD AUTO: 10.4 % — SIGNIFICANT CHANGE UP (ref 2–14)
MRSA PCR RESULT.: SIGNIFICANT CHANGE UP
NEUTROPHILS # BLD AUTO: 2.43 K/UL — SIGNIFICANT CHANGE UP (ref 1.8–7.4)
NEUTROPHILS NFR BLD AUTO: 54.7 % — SIGNIFICANT CHANGE UP (ref 43–77)
NITRITE UR-MCNC: NEGATIVE — SIGNIFICANT CHANGE UP
NT-PROBNP SERPL-SCNC: 101 PG/ML — SIGNIFICANT CHANGE UP (ref 0–300)
PH UR: 7 — SIGNIFICANT CHANGE UP (ref 5–8)
PLATELET # BLD AUTO: 271 K/UL — SIGNIFICANT CHANGE UP (ref 150–400)
POTASSIUM SERPL-MCNC: 4 MMOL/L — SIGNIFICANT CHANGE UP (ref 3.5–5.3)
POTASSIUM SERPL-SCNC: 4 MMOL/L — SIGNIFICANT CHANGE UP (ref 3.5–5.3)
PREALB SERPL-MCNC: 22 MG/DL — SIGNIFICANT CHANGE UP (ref 18–38)
PROT SERPL-MCNC: 6.8 G/DL — SIGNIFICANT CHANGE UP (ref 6.6–8.7)
PROT UR-MCNC: NEGATIVE MG/DL — SIGNIFICANT CHANGE UP
PROTHROM AB SERPL-ACNC: 11.1 SEC — SIGNIFICANT CHANGE UP (ref 10–12.9)
RBC # BLD: 3.93 M/UL — SIGNIFICANT CHANGE UP (ref 3.8–5.2)
RBC # FLD: 12.5 % — SIGNIFICANT CHANGE UP (ref 10.3–14.5)
S AUREUS DNA NOSE QL NAA+PROBE: DETECTED
SODIUM SERPL-SCNC: 142 MMOL/L — SIGNIFICANT CHANGE UP (ref 135–145)
SP GR SPEC: 1.01 — SIGNIFICANT CHANGE UP (ref 1.01–1.02)
T3 SERPL-MCNC: 116 NG/DL — SIGNIFICANT CHANGE UP (ref 80–200)
T4 AB SER-ACNC: 10.4 UG/DL — SIGNIFICANT CHANGE UP (ref 4.5–12)
TSH SERPL-MCNC: 2.12 UIU/ML — SIGNIFICANT CHANGE UP (ref 0.27–4.2)
UROBILINOGEN FLD QL: NEGATIVE MG/DL — SIGNIFICANT CHANGE UP
WBC # BLD: 4.44 K/UL — SIGNIFICANT CHANGE UP (ref 3.8–10.5)
WBC # FLD AUTO: 4.44 K/UL — SIGNIFICANT CHANGE UP (ref 3.8–10.5)

## 2019-10-15 PROCEDURE — 93880 EXTRACRANIAL BILAT STUDY: CPT | Mod: 26

## 2019-10-15 PROCEDURE — 93880 EXTRACRANIAL BILAT STUDY: CPT

## 2019-10-15 PROCEDURE — 93005 ELECTROCARDIOGRAM TRACING: CPT

## 2019-10-15 PROCEDURE — 85018 HEMOGLOBIN: CPT | Mod: QW

## 2019-10-15 PROCEDURE — 94727 GAS DIL/WSHOT DETER LNG VOL: CPT

## 2019-10-15 PROCEDURE — 93306 TTE W/DOPPLER COMPLETE: CPT

## 2019-10-15 PROCEDURE — 94010 BREATHING CAPACITY TEST: CPT

## 2019-10-15 PROCEDURE — 93306 TTE W/DOPPLER COMPLETE: CPT | Mod: 26

## 2019-10-15 PROCEDURE — 93010 ELECTROCARDIOGRAM REPORT: CPT

## 2019-10-15 PROCEDURE — 71046 X-RAY EXAM CHEST 2 VIEWS: CPT | Mod: 26

## 2019-10-15 PROCEDURE — 71046 X-RAY EXAM CHEST 2 VIEWS: CPT

## 2019-10-15 PROCEDURE — G0463: CPT

## 2019-10-15 PROCEDURE — 94729 DIFFUSING CAPACITY: CPT

## 2019-10-15 RX ORDER — QUINAPRIL HYDROCHLORIDE 40 MG/1
1 TABLET, FILM COATED ORAL
Qty: 0 | Refills: 0 | DISCHARGE

## 2019-10-15 RX ORDER — AMLODIPINE BESYLATE 2.5 MG/1
1 TABLET ORAL
Qty: 0 | Refills: 0 | DISCHARGE

## 2019-10-15 RX ORDER — INFLUENZA VIRUS VACCINE 15; 15; 15; 15 UG/.5ML; UG/.5ML; UG/.5ML; UG/.5ML
0.5 SUSPENSION INTRAMUSCULAR ONCE
Refills: 0 | Status: DISCONTINUED | OUTPATIENT
Start: 2019-10-15 | End: 2019-10-22

## 2019-10-15 RX ORDER — CEFUROXIME AXETIL 250 MG
1500 TABLET ORAL ONCE
Refills: 0 | Status: DISCONTINUED | OUTPATIENT
Start: 2019-10-21 | End: 2019-10-21

## 2019-10-15 NOTE — H&P PST ADULT - NSICDXPROBLEM_GEN_ALL_CORE_FT
PROBLEM DIAGNOSES  Problem: Thoracic aortic aneurysm, without rupture  Assessment and Plan: Bentall by Dr. Herrera    Problem: Need for prophylactic measure  Assessment and Plan: moderate risk surgical team to determine prophyalctic intervention

## 2019-10-15 NOTE — H&P PST ADULT - HISTORY OF PRESENT ILLNESS
73 y/o female with medical hx of HTN, HLD, valve disease, hypothyroidism seen today pre-op for Bentall for thoracic aortic aneurysm without rupture. Pt report stable aortic aneurysm but recently witnessed progressively worsening symptoms of fatigue, tiredness,  intermittent cough and was on quinapril for HTN that was  discontinued and started on Amlodipine, intermittent chest palpitation. Pt denies SOB, dyspnea on exertion and peripheral edema. Seen for a scheduled surgery with Dr. Herrera.

## 2019-10-15 NOTE — H&P PST ADULT - LAB RESULTS AND INTERPRETATION
[facility] [place] [facility]'s [place] spoke to Alan.

## 2019-10-15 NOTE — H&P PST ADULT - NSANTHOSAYNRD_GEN_A_CORE
No. SHIKHA screening performed.  STOP BANG Legend: 0-2 = LOW Risk; 3-4 = INTERMEDIATE Risk; 5-8 = HIGH Risk

## 2019-10-15 NOTE — H&P PST ADULT - ASSESSMENT
75 y/o female with medical hx of HTN, HLD, valve disease, hypothyroidism seen today pre-op for Bentall for thoracic aortic aneurysm without rupture. Pt report stable aortic aneurysm but recently witnessed progressively worsening symptoms of fatigue, tiredness,  intermittent cough and was on quinapril for HTN that was  discontinued and started on Amlodipine, intermittent chest palpitation. Pt denies SOB, dyspnea on exertion, peripheral edema. Surgery protocol reviewed with pt today  CAPRINI VTE 2.0 SCORE [CLOT updated 2019]    AGE RELATED RISK FACTORS                                                       MOBILITY RELATED FACTORS  [ ] Age 41-60 years                                            (1 Point)                    [ ] Bed rest                                                        (1 Point)  [ x] Age: 61-74 years                                           (2 Points)                  [ ] Plaster cast                                                   (2 Points)  [ ] Age= 75 years                                              (3 Points)                    [ ] Bed bound for more than 72 hours                 (2 Points)    DISEASE RELATED RISK FACTORS                                               GENDER SPECIFIC FACTORS  [ ] Edema in the lower extremities                       (1 Point)              [ ] Pregnancy                                                     (1 Point)  [x ] Varicose veins                                               (1 Point)                     [ ] Post-partum < 6 weeks                                   (1 Point)             [ ] BMI > 25 Kg/m2                                            (1 Point)                     [ ] Hormonal therapy  or oral contraception          (1 Point)                 [ ] Sepsis (in the previous month)                        (1 Point)               [ ] History of pregnancy complications                 (1 point)  [ ] Pneumonia or serious lung disease                                               [ ] Unexplained or recurrent                     (1 Point)           (in the previous month)                               (1 Point)  [ ] Abnormal pulmonary function test                     (1 Point)                 SURGERY RELATED RISK FACTORS  [ ] Acute myocardial infarction                              (1 Point)               [ ]  Section                                             (1 Point)  [ ] Congestive heart failure (in the previous month)  (1 Point)      [ ] Minor surgery                                                  (1 Point)   [ ] Inflammatory bowel disease                             (1 Point)               [ ] Arthroscopic surgery                                        (2 Points)  [ ] Central venous access                                      (2 Points)                [x ] General surgery lasting more than 45 minutes (2 points)  [ ] Malignancy- Present or previous                   (2 Points)                [ ] Elective arthroplasty                                         (5 points)    [ ] Stroke (in the previous month)                          (5 Points)                                                                                                                                                           HEMATOLOGY RELATED FACTORS                                                 TRAUMA RELATED RISK FACTORS  [ ] Prior episodes of VTE                                     (3 Points)                [ ] Fracture of the hip, pelvis, or leg                       (5 Points)  [ ] Positive family history for VTE                         (3 Points)             [ ] Acute spinal cord injury (in the previous month)  (5 Points)  [ ] Prothrombin 36034 A                                     (3 Points)               [ ] Paralysis  (less than 1 month)                             (5 Points)  [ ] Factor V Leiden                                             (3 Points)                  [ ] Multiple Trauma within 1 month                        (5 Points)  [ ] Lupus anticoagulants                                     (3 Points)                                                           [ ] Anticardiolipin antibodies                               (3 Points)                                                       [ ] High homocysteine in the blood                      (3 Points)                                             [ ] Other congenital or acquired thrombophilia      (3 Points)                                                [ ] Heparin induced thrombocytopenia                  (3 Points)                                     Total Score [      5    ]  OPIOID RISK TOOL    LEONILA EACH BOX THAT APPLIES AND ADD TOTALS AT THE END    FAMILY HISTORY OF SUBSTANCE ABUSE                 FEMALE         MALE                                                Alcohol                             [  ]1 pt          [  ]3pts                                               Illegal Durgs                     [  ]2 pts        [  ]3pts                                               Rx Drugs                           [  ]4 pts        [  ]4 pts    PERSONAL HISTORY OF SUBSTANCE ABUSE                                                                                          Alcohol                             [  ]3 pts       [  ]3 pts                                               Illegal Drugs                     [  ]4 pts        [  ]4 pts                                               Rx Drugs                           [  ]5 pts        [  ]5 pts    AGE BETWEEN 16-45 YEARS                                      [  ]1 pt         [  ]1 pt    HISTORY OF PREADOLESCENT   SEXUAL ABUSE                                                             [  ]3 pts        [  ]0pts    PSYCHOLOGICAL DISEASE                     ADD, OCD, Bipolar, Schizophrenia        [  ]2 pts         [  ]2 pts                      Depression                                               [x  ]1 pt           [  ]1 pt           SCORING TOTAL   (add numbers and type here)              (**1*)                                     A score of 3 or lower indicated LOW risk for future opioid abuse  A score of 4 to 7 indicated moderate risk for future opioid abuse  A score of 8 or higher indicates a high risk for opioid abuse

## 2019-10-15 NOTE — H&P PST ADULT - NSICDXFAMILYHX_GEN_ALL_CORE_FT
FAMILY HISTORY:  Father  Still living? Unknown  FH: hypertension, Age at diagnosis: Age Unknown  FHx: liver cancer, Age at diagnosis: Age Unknown    Mother  Still living? No  FH: breast cancer, Age at diagnosis: Age Unknown  FHx: liver cancer, Age at diagnosis: Age Unknown

## 2019-10-15 NOTE — PATIENT PROFILE ADULT - NSPROEDALEARNPREF_GEN_A_NUR
verbal instruction/written material/pre-op instructions, surgical wash & pain management reviewed/individual instruction

## 2019-10-16 LAB
CULTURE RESULTS: NO GROWTH — SIGNIFICANT CHANGE UP
SPECIMEN SOURCE: SIGNIFICANT CHANGE UP

## 2019-10-21 ENCOUNTER — RESULT REVIEW (OUTPATIENT)
Age: 74
End: 2019-10-21

## 2019-10-21 ENCOUNTER — APPOINTMENT (OUTPATIENT)
Dept: CARDIOTHORACIC SURGERY | Facility: HOSPITAL | Age: 74
End: 2019-10-21

## 2019-10-21 ENCOUNTER — INPATIENT (INPATIENT)
Facility: HOSPITAL | Age: 74
LOS: 4 days | Discharge: HOME | DRG: 220 | End: 2019-10-26
Attending: THORACIC SURGERY (CARDIOTHORACIC VASCULAR SURGERY) | Admitting: THORACIC SURGERY (CARDIOTHORACIC VASCULAR SURGERY)
Payer: MEDICARE

## 2019-10-21 VITALS
WEIGHT: 114.64 LBS | TEMPERATURE: 99 F | OXYGEN SATURATION: 100 % | RESPIRATION RATE: 16 BRPM | HEIGHT: 59 IN | HEART RATE: 59 BPM | SYSTOLIC BLOOD PRESSURE: 161 MMHG | DIASTOLIC BLOOD PRESSURE: 57 MMHG

## 2019-10-21 DIAGNOSIS — Z98.890 OTHER SPECIFIED POSTPROCEDURAL STATES: Chronic | ICD-10-CM

## 2019-10-21 DIAGNOSIS — I71.2 THORACIC AORTIC ANEURYSM, WITHOUT RUPTURE: ICD-10-CM

## 2019-10-21 LAB
ABO RH CONFIRMATION: SIGNIFICANT CHANGE UP
ALBUMIN SERPL ELPH-MCNC: 3 G/DL — LOW (ref 3.3–5.2)
ALP SERPL-CCNC: 48 U/L — SIGNIFICANT CHANGE UP (ref 40–120)
ALT FLD-CCNC: 16 U/L — SIGNIFICANT CHANGE UP
ANION GAP SERPL CALC-SCNC: 11 MMOL/L — SIGNIFICANT CHANGE UP (ref 5–17)
APTT BLD: 30.4 SEC — SIGNIFICANT CHANGE UP (ref 27.5–36.3)
AST SERPL-CCNC: 34 U/L — HIGH
BASOPHILS # BLD AUTO: 0.02 K/UL — SIGNIFICANT CHANGE UP (ref 0–0.2)
BASOPHILS NFR BLD AUTO: 0.2 % — SIGNIFICANT CHANGE UP (ref 0–2)
BILIRUB SERPL-MCNC: 0.9 MG/DL — SIGNIFICANT CHANGE UP (ref 0.4–2)
BUN SERPL-MCNC: 14 MG/DL — SIGNIFICANT CHANGE UP (ref 8–20)
CALCIUM SERPL-MCNC: 9.9 MG/DL — SIGNIFICANT CHANGE UP (ref 8.6–10.2)
CHLORIDE SERPL-SCNC: 106 MMOL/L — SIGNIFICANT CHANGE UP (ref 98–107)
CO2 SERPL-SCNC: 25 MMOL/L — SIGNIFICANT CHANGE UP (ref 22–29)
CREAT SERPL-MCNC: 0.45 MG/DL — LOW (ref 0.5–1.3)
EOSINOPHIL # BLD AUTO: 0.11 K/UL — SIGNIFICANT CHANGE UP (ref 0–0.5)
EOSINOPHIL NFR BLD AUTO: 1.2 % — SIGNIFICANT CHANGE UP (ref 0–6)
GAS PNL BLDA: SIGNIFICANT CHANGE UP
GLUCOSE BLDC GLUCOMTR-MCNC: 115 MG/DL — HIGH (ref 70–99)
GLUCOSE BLDC GLUCOMTR-MCNC: 126 MG/DL — HIGH (ref 70–99)
GLUCOSE BLDC GLUCOMTR-MCNC: 128 MG/DL — HIGH (ref 70–99)
GLUCOSE BLDC GLUCOMTR-MCNC: 131 MG/DL — HIGH (ref 70–99)
GLUCOSE BLDC GLUCOMTR-MCNC: 138 MG/DL — HIGH (ref 70–99)
GLUCOSE BLDC GLUCOMTR-MCNC: 145 MG/DL — HIGH (ref 70–99)
GLUCOSE BLDC GLUCOMTR-MCNC: 161 MG/DL — HIGH (ref 70–99)
GLUCOSE BLDC GLUCOMTR-MCNC: 99 MG/DL — SIGNIFICANT CHANGE UP (ref 70–99)
GLUCOSE SERPL-MCNC: 135 MG/DL — HIGH (ref 70–115)
HCT VFR BLD CALC: 27.2 % — LOW (ref 34.5–45)
HGB BLD-MCNC: 9.6 G/DL — LOW (ref 11.5–15.5)
IMM GRANULOCYTES NFR BLD AUTO: 1.4 % — SIGNIFICANT CHANGE UP (ref 0–1.5)
INR BLD: 1.21 RATIO — HIGH (ref 0.88–1.16)
LYMPHOCYTES # BLD AUTO: 1.55 K/UL — SIGNIFICANT CHANGE UP (ref 1–3.3)
LYMPHOCYTES # BLD AUTO: 16.4 % — SIGNIFICANT CHANGE UP (ref 13–44)
MAGNESIUM SERPL-MCNC: 2.4 MG/DL — SIGNIFICANT CHANGE UP (ref 1.6–2.6)
MCHC RBC-ENTMCNC: 32.1 PG — SIGNIFICANT CHANGE UP (ref 27–34)
MCHC RBC-ENTMCNC: 35.3 GM/DL — SIGNIFICANT CHANGE UP (ref 32–36)
MCV RBC AUTO: 91 FL — SIGNIFICANT CHANGE UP (ref 80–100)
MONOCYTES # BLD AUTO: 0.44 K/UL — SIGNIFICANT CHANGE UP (ref 0–0.9)
MONOCYTES NFR BLD AUTO: 4.7 % — SIGNIFICANT CHANGE UP (ref 2–14)
NEUTROPHILS # BLD AUTO: 7.2 K/UL — SIGNIFICANT CHANGE UP (ref 1.8–7.4)
NEUTROPHILS NFR BLD AUTO: 76.1 % — SIGNIFICANT CHANGE UP (ref 43–77)
PLATELET # BLD AUTO: 158 K/UL — SIGNIFICANT CHANGE UP (ref 150–400)
POTASSIUM SERPL-MCNC: 4.2 MMOL/L — SIGNIFICANT CHANGE UP (ref 3.5–5.3)
POTASSIUM SERPL-SCNC: 4.2 MMOL/L — SIGNIFICANT CHANGE UP (ref 3.5–5.3)
PROT SERPL-MCNC: 4.5 G/DL — LOW (ref 6.6–8.7)
PROTHROM AB SERPL-ACNC: 14 SEC — HIGH (ref 10–12.9)
RBC # BLD: 2.99 M/UL — LOW (ref 3.8–5.2)
RBC # FLD: 13.4 % — SIGNIFICANT CHANGE UP (ref 10.3–14.5)
SODIUM SERPL-SCNC: 142 MMOL/L — SIGNIFICANT CHANGE UP (ref 135–145)
WBC # BLD: 9.45 K/UL — SIGNIFICANT CHANGE UP (ref 3.8–10.5)
WBC # FLD AUTO: 9.45 K/UL — SIGNIFICANT CHANGE UP (ref 3.8–10.5)

## 2019-10-21 PROCEDURE — 88305 TISSUE EXAM BY PATHOLOGIST: CPT | Mod: 26

## 2019-10-21 PROCEDURE — 93010 ELECTROCARDIOGRAM REPORT: CPT

## 2019-10-21 PROCEDURE — 33405 REPLACEMENT AORTIC VALVE OPN: CPT

## 2019-10-21 PROCEDURE — 71045 X-RAY EXAM CHEST 1 VIEW: CPT | Mod: 26

## 2019-10-21 PROCEDURE — 33860: CPT

## 2019-10-21 PROCEDURE — 93355 ECHO TRANSESOPHAGEAL (TEE): CPT

## 2019-10-21 PROCEDURE — 88311 DECALCIFY TISSUE: CPT | Mod: 26

## 2019-10-21 PROCEDURE — 33405 REPLACEMENT AORTIC VALVE OPN: CPT | Mod: AS

## 2019-10-21 PROCEDURE — 33860: CPT | Mod: AS

## 2019-10-21 RX ORDER — ASPIRIN/CALCIUM CARB/MAGNESIUM 324 MG
325 TABLET ORAL ONCE
Refills: 0 | Status: DISCONTINUED | OUTPATIENT
Start: 2019-10-21 | End: 2019-10-21

## 2019-10-21 RX ORDER — POTASSIUM CHLORIDE 20 MEQ
10 PACKET (EA) ORAL
Refills: 0 | Status: DISCONTINUED | OUTPATIENT
Start: 2019-10-21 | End: 2019-10-23

## 2019-10-21 RX ORDER — FENTANYL CITRATE 50 UG/ML
50 INJECTION INTRAVENOUS ONCE
Refills: 0 | Status: DISCONTINUED | OUTPATIENT
Start: 2019-10-21 | End: 2019-10-21

## 2019-10-21 RX ORDER — NICARDIPINE HYDROCHLORIDE 30 MG/1
5 CAPSULE, EXTENDED RELEASE ORAL
Qty: 40 | Refills: 0 | Status: DISCONTINUED | OUTPATIENT
Start: 2019-10-21 | End: 2019-10-22

## 2019-10-21 RX ORDER — CHLORHEXIDINE GLUCONATE 213 G/1000ML
15 SOLUTION TOPICAL EVERY 12 HOURS
Refills: 0 | Status: DISCONTINUED | OUTPATIENT
Start: 2019-10-21 | End: 2019-10-22

## 2019-10-21 RX ORDER — INSULIN HUMAN 100 [IU]/ML
2 INJECTION, SOLUTION SUBCUTANEOUS
Qty: 250 | Refills: 0 | Status: DISCONTINUED | OUTPATIENT
Start: 2019-10-21 | End: 2019-10-22

## 2019-10-21 RX ORDER — SODIUM CHLORIDE 9 MG/ML
1000 INJECTION INTRAMUSCULAR; INTRAVENOUS; SUBCUTANEOUS
Refills: 0 | Status: DISCONTINUED | OUTPATIENT
Start: 2019-10-21 | End: 2019-10-23

## 2019-10-21 RX ORDER — VANCOMYCIN HCL 1 G
750 VIAL (EA) INTRAVENOUS EVERY 12 HOURS
Refills: 0 | Status: COMPLETED | OUTPATIENT
Start: 2019-10-21 | End: 2019-10-23

## 2019-10-21 RX ORDER — PROPOFOL 10 MG/ML
16.03 INJECTION, EMULSION INTRAVENOUS
Qty: 500 | Refills: 0 | Status: DISCONTINUED | OUTPATIENT
Start: 2019-10-21 | End: 2019-10-21

## 2019-10-21 RX ORDER — SODIUM CHLORIDE 9 MG/ML
3 INJECTION INTRAMUSCULAR; INTRAVENOUS; SUBCUTANEOUS EVERY 8 HOURS
Refills: 0 | Status: DISCONTINUED | OUTPATIENT
Start: 2019-10-21 | End: 2019-10-21

## 2019-10-21 RX ORDER — PANTOPRAZOLE SODIUM 20 MG/1
40 TABLET, DELAYED RELEASE ORAL ONCE
Refills: 0 | Status: COMPLETED | OUTPATIENT
Start: 2019-10-21 | End: 2019-10-21

## 2019-10-21 RX ORDER — POTASSIUM CHLORIDE 20 MEQ
10 PACKET (EA) ORAL
Refills: 0 | Status: DISCONTINUED | OUTPATIENT
Start: 2019-10-21 | End: 2019-10-22

## 2019-10-21 RX ORDER — POTASSIUM CHLORIDE 20 MEQ
10 PACKET (EA) ORAL
Refills: 0 | Status: COMPLETED | OUTPATIENT
Start: 2019-10-21 | End: 2019-10-21

## 2019-10-21 RX ORDER — NOREPINEPHRINE BITARTRATE/D5W 8 MG/250ML
0.05 PLASTIC BAG, INJECTION (ML) INTRAVENOUS
Qty: 8 | Refills: 0 | Status: DISCONTINUED | OUTPATIENT
Start: 2019-10-21 | End: 2019-10-21

## 2019-10-21 RX ORDER — VANCOMYCIN HCL 1 G
1000 VIAL (EA) INTRAVENOUS EVERY 12 HOURS
Refills: 0 | Status: DISCONTINUED | OUTPATIENT
Start: 2019-10-21 | End: 2019-10-21

## 2019-10-21 RX ORDER — LEVOTHYROXINE SODIUM 125 MCG
75 TABLET ORAL DAILY
Refills: 0 | Status: DISCONTINUED | OUTPATIENT
Start: 2019-10-22 | End: 2019-10-26

## 2019-10-21 RX ORDER — EPINEPHRINE 0.3 MG/.3ML
0.02 INJECTION INTRAMUSCULAR; SUBCUTANEOUS
Qty: 4 | Refills: 0 | Status: DISCONTINUED | OUTPATIENT
Start: 2019-10-21 | End: 2019-10-21

## 2019-10-21 RX ORDER — CEFUROXIME AXETIL 250 MG
1500 TABLET ORAL EVERY 8 HOURS
Refills: 0 | Status: DISCONTINUED | OUTPATIENT
Start: 2019-10-21 | End: 2019-10-21

## 2019-10-21 RX ORDER — DEXTROSE 50 % IN WATER 50 %
50 SYRINGE (ML) INTRAVENOUS
Refills: 0 | Status: DISCONTINUED | OUTPATIENT
Start: 2019-10-21 | End: 2019-10-22

## 2019-10-21 RX ORDER — SENNA PLUS 8.6 MG/1
2 TABLET ORAL AT BEDTIME
Refills: 0 | Status: DISCONTINUED | OUTPATIENT
Start: 2019-10-22 | End: 2019-10-25

## 2019-10-21 RX ORDER — SIMVASTATIN 20 MG/1
20 TABLET, FILM COATED ORAL AT BEDTIME
Refills: 0 | Status: DISCONTINUED | OUTPATIENT
Start: 2019-10-21 | End: 2019-10-26

## 2019-10-21 RX ORDER — CEFUROXIME AXETIL 250 MG
1500 TABLET ORAL EVERY 8 HOURS
Refills: 0 | Status: COMPLETED | OUTPATIENT
Start: 2019-10-21 | End: 2019-10-23

## 2019-10-21 RX ORDER — DOCUSATE SODIUM 100 MG
100 CAPSULE ORAL THREE TIMES A DAY
Refills: 0 | Status: DISCONTINUED | OUTPATIENT
Start: 2019-10-21 | End: 2019-10-25

## 2019-10-21 RX ORDER — PANTOPRAZOLE SODIUM 20 MG/1
40 TABLET, DELAYED RELEASE ORAL DAILY
Refills: 0 | Status: DISCONTINUED | OUTPATIENT
Start: 2019-10-22 | End: 2019-10-26

## 2019-10-21 RX ORDER — CHLORHEXIDINE GLUCONATE 213 G/1000ML
1 SOLUTION TOPICAL DAILY
Refills: 0 | Status: DISCONTINUED | OUTPATIENT
Start: 2019-10-21 | End: 2019-10-23

## 2019-10-21 RX ORDER — POLYETHYLENE GLYCOL 3350 17 G/17G
17 POWDER, FOR SOLUTION ORAL DAILY
Refills: 0 | Status: DISCONTINUED | OUTPATIENT
Start: 2019-10-22 | End: 2019-10-25

## 2019-10-21 RX ORDER — ASPIRIN/CALCIUM CARB/MAGNESIUM 324 MG
325 TABLET ORAL DAILY
Refills: 0 | Status: DISCONTINUED | OUTPATIENT
Start: 2019-10-22 | End: 2019-10-26

## 2019-10-21 RX ORDER — ESCITALOPRAM OXALATE 10 MG/1
10 TABLET, FILM COATED ORAL DAILY
Refills: 0 | Status: DISCONTINUED | OUTPATIENT
Start: 2019-10-22 | End: 2019-10-26

## 2019-10-21 RX ORDER — DEXTROSE 50 % IN WATER 50 %
25 SYRINGE (ML) INTRAVENOUS
Refills: 0 | Status: DISCONTINUED | OUTPATIENT
Start: 2019-10-21 | End: 2019-10-22

## 2019-10-21 RX ADMIN — INSULIN HUMAN 2 UNIT(S)/HR: 100 INJECTION, SOLUTION SUBCUTANEOUS at 14:22

## 2019-10-21 RX ADMIN — CHLORHEXIDINE GLUCONATE 15 MILLILITER(S): 213 SOLUTION TOPICAL at 17:01

## 2019-10-21 RX ADMIN — Medication 100 MILLIEQUIVALENT(S): at 13:55

## 2019-10-21 RX ADMIN — Medication 100 MILLIGRAM(S): at 16:09

## 2019-10-21 RX ADMIN — SIMVASTATIN 20 MILLIGRAM(S): 20 TABLET, FILM COATED ORAL at 23:15

## 2019-10-21 RX ADMIN — NICARDIPINE HYDROCHLORIDE 25 MG/HR: 30 CAPSULE, EXTENDED RELEASE ORAL at 14:22

## 2019-10-21 RX ADMIN — Medication 250 MILLIGRAM(S): at 20:46

## 2019-10-21 RX ADMIN — PANTOPRAZOLE SODIUM 40 MILLIGRAM(S): 20 TABLET, DELAYED RELEASE ORAL at 12:52

## 2019-10-21 RX ADMIN — FENTANYL CITRATE 50 MICROGRAM(S): 50 INJECTION INTRAVENOUS at 12:52

## 2019-10-21 RX ADMIN — Medication 100 MILLIEQUIVALENT(S): at 13:15

## 2019-10-21 RX ADMIN — FENTANYL CITRATE 50 MICROGRAM(S): 50 INJECTION INTRAVENOUS at 13:00

## 2019-10-21 NOTE — BRIEF OPERATIVE NOTE - COMMENTS
patient tx to [facility]   Gtts: epi, levophed, insulin   Blood products given intraop: 3 prbc, 2 ffp, 500 feiba, 1 platelets, 5 cryo   Invasive lines placed: L radial, R IJ slic, schwartz   Indwelling catheters: schwartz   Pacing wires: 1 v 1g   Chest tubes: 2 mediastinal chest tubes, 1 left     No qualified resident available to perform operation patient tx to [facility]ICU   Gtts: levophed, insulin   Blood products given intraop: 3 prbc, 2 ffp, 500 feiba, 1 platelets, 5 cryo   Invasive lines placed: L radial, R IJ slic, schwartz   Indwelling catheters: schwartz   Pacing wires: 1 v 1g   Chest tubes: 2 mediastinal chest tubes, 1 left     No qualified resident was available to assist in this case. I have personally first assisted the Cardiothoracic Surgeon listed in this brief op note throughout the entirety of this case.

## 2019-10-21 NOTE — BRIEF OPERATIVE NOTE - NSICDXBRIEFPROCEDURE_GEN_ALL_CORE_FT
PROCEDURES:  Ascending aortic aneurysm repair 21-Oct-2019 10:59:57 with 32 mm decron graft  23 mm villegas valve replacement   native coronary arteries and root remain- root apraing procedure Carolee Parisi

## 2019-10-21 NOTE — BRIEF OPERATIVE NOTE - NSICDXBRIEFPOSTOP_GEN_ALL_CORE_FT
POST-OP DIAGNOSIS:  Ascending aortic aneurysm 21-Oct-2019 11:02:46  Carolee Parisi  Aortic insufficiency 21-Oct-2019 11:01:31  Carolee Parisi

## 2019-10-21 NOTE — BRIEF OPERATIVE NOTE - NSICDXBRIEFPREOP_GEN_ALL_CORE_FT
PRE-OP DIAGNOSIS:  Ascending aortic aneurysm 21-Oct-2019 11:01:22  Carolee Parisi  Aortic insufficiency 21-Oct-2019 11:00:55  Carolee Parisi

## 2019-10-22 DIAGNOSIS — I10 ESSENTIAL (PRIMARY) HYPERTENSION: ICD-10-CM

## 2019-10-22 DIAGNOSIS — E78.5 HYPERLIPIDEMIA, UNSPECIFIED: ICD-10-CM

## 2019-10-22 DIAGNOSIS — I71.9 AORTIC ANEURYSM OF UNSPECIFIED SITE, WITHOUT RUPTURE: ICD-10-CM

## 2019-10-22 LAB
ALBUMIN SERPL ELPH-MCNC: 3.8 G/DL — SIGNIFICANT CHANGE UP (ref 3.3–5.2)
ALP SERPL-CCNC: 52 U/L — SIGNIFICANT CHANGE UP (ref 40–120)
ALT FLD-CCNC: 19 U/L — SIGNIFICANT CHANGE UP
ANION GAP SERPL CALC-SCNC: 11 MMOL/L — SIGNIFICANT CHANGE UP (ref 5–17)
APTT BLD: 29.1 SEC — SIGNIFICANT CHANGE UP (ref 27.5–36.3)
AST SERPL-CCNC: 49 U/L — HIGH
BILIRUB SERPL-MCNC: 1.7 MG/DL — SIGNIFICANT CHANGE UP (ref 0.4–2)
BUN SERPL-MCNC: 22 MG/DL — HIGH (ref 8–20)
CALCIUM SERPL-MCNC: 8 MG/DL — LOW (ref 8.6–10.2)
CHLORIDE SERPL-SCNC: 107 MMOL/L — SIGNIFICANT CHANGE UP (ref 98–107)
CK MB CFR SERPL CALC: 16.7 NG/ML — HIGH (ref 0–6.7)
CK SERPL-CCNC: 314 U/L — HIGH (ref 25–170)
CO2 SERPL-SCNC: 24 MMOL/L — SIGNIFICANT CHANGE UP (ref 22–29)
CREAT SERPL-MCNC: 0.55 MG/DL — SIGNIFICANT CHANGE UP (ref 0.5–1.3)
GLUCOSE BLDC GLUCOMTR-MCNC: 112 MG/DL — HIGH (ref 70–99)
GLUCOSE BLDC GLUCOMTR-MCNC: 117 MG/DL — HIGH (ref 70–99)
GLUCOSE BLDC GLUCOMTR-MCNC: 121 MG/DL — HIGH (ref 70–99)
GLUCOSE BLDC GLUCOMTR-MCNC: 123 MG/DL — HIGH (ref 70–99)
GLUCOSE BLDC GLUCOMTR-MCNC: 123 MG/DL — HIGH (ref 70–99)
GLUCOSE BLDC GLUCOMTR-MCNC: 127 MG/DL — HIGH (ref 70–99)
GLUCOSE BLDC GLUCOMTR-MCNC: 140 MG/DL — HIGH (ref 70–99)
GLUCOSE BLDC GLUCOMTR-MCNC: 38 MG/DL — CRITICAL LOW (ref 70–99)
GLUCOSE BLDC GLUCOMTR-MCNC: 87 MG/DL — SIGNIFICANT CHANGE UP (ref 70–99)
GLUCOSE SERPL-MCNC: 132 MG/DL — HIGH (ref 70–115)
HCT VFR BLD CALC: 37.2 % — SIGNIFICANT CHANGE UP (ref 34.5–45)
HGB BLD-MCNC: 12.8 G/DL — SIGNIFICANT CHANGE UP (ref 11.5–15.5)
INR BLD: 1.11 RATIO — SIGNIFICANT CHANGE UP (ref 0.88–1.16)
MAGNESIUM SERPL-MCNC: 2 MG/DL — SIGNIFICANT CHANGE UP (ref 1.6–2.6)
MCHC RBC-ENTMCNC: 31.2 PG — SIGNIFICANT CHANGE UP (ref 27–34)
MCHC RBC-ENTMCNC: 34.4 GM/DL — SIGNIFICANT CHANGE UP (ref 32–36)
MCV RBC AUTO: 90.7 FL — SIGNIFICANT CHANGE UP (ref 80–100)
PLATELET # BLD AUTO: 174 K/UL — SIGNIFICANT CHANGE UP (ref 150–400)
POTASSIUM SERPL-MCNC: 5.2 MMOL/L — SIGNIFICANT CHANGE UP (ref 3.5–5.3)
POTASSIUM SERPL-SCNC: 5.2 MMOL/L — SIGNIFICANT CHANGE UP (ref 3.5–5.3)
PROT SERPL-MCNC: 5.6 G/DL — LOW (ref 6.6–8.7)
PROTHROM AB SERPL-ACNC: 12.8 SEC — SIGNIFICANT CHANGE UP (ref 10–12.9)
RBC # BLD: 4.1 M/UL — SIGNIFICANT CHANGE UP (ref 3.8–5.2)
RBC # FLD: 14.2 % — SIGNIFICANT CHANGE UP (ref 10.3–14.5)
SODIUM SERPL-SCNC: 142 MMOL/L — SIGNIFICANT CHANGE UP (ref 135–145)
TROPONIN T SERPL-MCNC: 0.44 NG/ML — HIGH (ref 0–0.06)
WBC # BLD: 13.88 K/UL — HIGH (ref 3.8–10.5)
WBC # FLD AUTO: 13.88 K/UL — HIGH (ref 3.8–10.5)

## 2019-10-22 PROCEDURE — 71045 X-RAY EXAM CHEST 1 VIEW: CPT | Mod: 26

## 2019-10-22 PROCEDURE — 93010 ELECTROCARDIOGRAM REPORT: CPT

## 2019-10-22 RX ORDER — ACETAMINOPHEN 500 MG
1000 TABLET ORAL ONCE
Refills: 0 | Status: COMPLETED | OUTPATIENT
Start: 2019-10-22 | End: 2019-10-22

## 2019-10-22 RX ORDER — POTASSIUM CHLORIDE 20 MEQ
10 PACKET (EA) ORAL
Refills: 0 | Status: DISCONTINUED | OUTPATIENT
Start: 2019-10-22 | End: 2019-10-22

## 2019-10-22 RX ORDER — ALBUMIN HUMAN 25 %
250 VIAL (ML) INTRAVENOUS ONCE
Refills: 0 | Status: COMPLETED | OUTPATIENT
Start: 2019-10-22 | End: 2019-10-22

## 2019-10-22 RX ORDER — POTASSIUM CHLORIDE 20 MEQ
10 PACKET (EA) ORAL
Refills: 0 | Status: COMPLETED | OUTPATIENT
Start: 2019-10-22 | End: 2019-10-22

## 2019-10-22 RX ORDER — INSULIN LISPRO 100/ML
VIAL (ML) SUBCUTANEOUS
Refills: 0 | Status: DISCONTINUED | OUTPATIENT
Start: 2019-10-22 | End: 2019-10-24

## 2019-10-22 RX ORDER — ENOXAPARIN SODIUM 100 MG/ML
40 INJECTION SUBCUTANEOUS DAILY
Refills: 0 | Status: DISCONTINUED | OUTPATIENT
Start: 2019-10-22 | End: 2019-10-24

## 2019-10-22 RX ORDER — ONDANSETRON 8 MG/1
4 TABLET, FILM COATED ORAL ONCE
Refills: 0 | Status: COMPLETED | OUTPATIENT
Start: 2019-10-22 | End: 2019-10-22

## 2019-10-22 RX ADMIN — Medication 75 MICROGRAM(S): at 06:21

## 2019-10-22 RX ADMIN — PANTOPRAZOLE SODIUM 40 MILLIGRAM(S): 20 TABLET, DELAYED RELEASE ORAL at 11:20

## 2019-10-22 RX ADMIN — Medication 1000 MILLIGRAM(S): at 16:15

## 2019-10-22 RX ADMIN — Medication 250 MILLIGRAM(S): at 11:24

## 2019-10-22 RX ADMIN — Medication 100 MILLIGRAM(S): at 09:40

## 2019-10-22 RX ADMIN — SENNA PLUS 2 TABLET(S): 8.6 TABLET ORAL at 22:08

## 2019-10-22 RX ADMIN — ONDANSETRON 4 MILLIGRAM(S): 8 TABLET, FILM COATED ORAL at 00:22

## 2019-10-22 RX ADMIN — Medication 100 MILLIGRAM(S): at 01:13

## 2019-10-22 RX ADMIN — Medication 100 MILLIEQUIVALENT(S): at 00:17

## 2019-10-22 RX ADMIN — Medication 400 MILLIGRAM(S): at 16:00

## 2019-10-22 RX ADMIN — ESCITALOPRAM OXALATE 10 MILLIGRAM(S): 10 TABLET, FILM COATED ORAL at 11:20

## 2019-10-22 RX ADMIN — Medication 100 MILLIGRAM(S): at 06:21

## 2019-10-22 RX ADMIN — Medication 100 MILLIEQUIVALENT(S): at 02:30

## 2019-10-22 RX ADMIN — Medication 125 MILLILITER(S): at 03:28

## 2019-10-22 RX ADMIN — CHLORHEXIDINE GLUCONATE 1 APPLICATION(S): 213 SOLUTION TOPICAL at 06:19

## 2019-10-22 RX ADMIN — Medication 100 MILLIEQUIVALENT(S): at 01:30

## 2019-10-22 RX ADMIN — Medication 250 MILLIGRAM(S): at 20:00

## 2019-10-22 RX ADMIN — SIMVASTATIN 20 MILLIGRAM(S): 20 TABLET, FILM COATED ORAL at 22:09

## 2019-10-22 RX ADMIN — Medication 100 MILLIGRAM(S): at 22:08

## 2019-10-22 RX ADMIN — Medication 100 MILLIGRAM(S): at 15:11

## 2019-10-22 RX ADMIN — POLYETHYLENE GLYCOL 3350 17 GRAM(S): 17 POWDER, FOR SOLUTION ORAL at 11:20

## 2019-10-22 RX ADMIN — ENOXAPARIN SODIUM 40 MILLIGRAM(S): 100 INJECTION SUBCUTANEOUS at 15:11

## 2019-10-22 RX ADMIN — Medication 325 MILLIGRAM(S): at 11:20

## 2019-10-22 RX ADMIN — Medication 100 MILLIGRAM(S): at 17:45

## 2019-10-22 NOTE — PROGRESS NOTE ADULT - ASSESSMENT
10/21 Acending aorta replacement, with AVR. Extubated. 10/21 Acending aorta replacement, with 32 mm dAcron graft, 23 mm villegas valve replacement native coronary arteries and root remain- root sparing procedure with AVR. Extubated.

## 2019-10-22 NOTE — PHYSICAL THERAPY INITIAL EVALUATION ADULT - ACTIVE RANGE OF MOTION EXAMINATION, REHAB EVAL
bilateral  lower extremity Active ROM was WFL (within functional limits)/bilateral shoulder flex limited to 90 degrees for testing due to surgery/bilateral upper extremity Active ROM was WFL (within functional limits)

## 2019-10-22 NOTE — PHYSICAL THERAPY INITIAL EVALUATION ADULT - LEVEL OF INDEPENDENCE: SCOOT/BRIDGE, REHAB EVAL
Met with wife today - she spoke with Sharlene Barrientos NP who informed her that patient would be staying [facility] for the duration. There will be no charge even if he does transition to Routine as he doesn't have the option to go home without a PCP pump. She appeared very relieved that she didn't have to worry about finances or moving him him. She relayed that Sharlene Barrientos informed her that she believed he had only a few days to a week. She has called her family to come home. Sad but coping appropriately considering the circumstances. Discussed bereavement - she will consider but very appreciative of everything hospice is doing. unable to perform/pt oob in chair

## 2019-10-22 NOTE — PROGRESS NOTE ADULT - SUBJECTIVE AND OBJECTIVE BOX
Subjective: Extubated with incident overnight. No c/o incisional pain at this time. Denies CP, SOB, palpitations, N/V, other c/o.    T(C): 37.6 (10-22-19 @ 00:30), Max: 37.6 (10-21-19 @ 22:00)  HR: 80 (10-22-19 @ 00:45) (59 - 84)  BP: 117/68 (10-21-19 @ 20:20) (117/68 - 161/57)  ABP: 130/53 (10-22-19 @ 00:45) (102/55 - 145/77)  ABP(mean): 75 (10-22-19 @ 00:45) (72 - 102)  RR: 22 (10-22-19 @ 00:45) (8 - 22)  SpO2: 96% (10-22-19 @ 00:45) (96% - 100%)  Wt(kg): --  CVP(mm Hg): 6 (10-22-19 @ 00:45) (4 - 14)  CO: 3.9 (10-22-19 @ 00:45) (3.9 - 3.9)  CI: 2.6 (10-22-19 @ 00:45) (2.6 - 2.6)  PA: --   Mode: CPAP with PS  FiO2: 40  PEEP: 5  PS: 5  MAP: 8      I&O's Detail    21 Oct 2019 07:01  -  22 Oct 2019 01:04  --------------------------------------------------------  IN:    Frozen Plasma Cryoprecipitate Reduced: 113 mL    insulin regular Infusion: 12.5 mL    niCARdipine Infusion: 425 mL    Packed Red Blood Cells: 656 mL    propofol Infusion: 15 mL    sodium chloride 0.9%.: 65 mL    sodium chloride 0.9%.: 130 mL    Solution: 250 mL    Solution: 100 mL    Solution: 50 mL  Total IN: 1816.5 mL    OUT:    Chest Tube: 690 mL    Indwelling Catheter - Urethral: 680 mL    Nasoenteral Tube: 100 mL  Total OUT: 1470 mL    Total NET: 346.5 mL          LABS: All Lab data reviewed and analyzed                        9.6    9.45  )-----------( 158      ( 21 Oct 2019 12:50 )             27.2     10-21    142  |  106  |  14.0  ----------------------------<  135<H>  4.2   |  25.0  |  0.45<L>    Ca    9.9      21 Oct 2019 12:50  Mg     2.4     10-21    TPro  4.5<L>  /  Alb  3.0<L>  /  TBili  0.9  /  DBili  x   /  AST  34<H>  /  ALT  16  /  AlkPhos  48  10-21    PT/INR - ( 21 Oct 2019 12:50 )   PT: 14.0 sec;   INR: 1.21 ratio         PTT - ( 21 Oct 2019 12:50 )  PTT:30.4 sec      ABG - ( 21 Oct 2019 23:55 )  pH, Arterial: 7.33  pH, Blood: x     /  pCO2: 50    /  pO2: 118   / HCO3: 24    / Base Excess: -0.5  /  SaO2: 99          CAPILLARY BLOOD GLUCOSE      POCT Blood Glucose.: 131 mg/dL (21 Oct 2019 23:05)  POCT Blood Glucose.: 128 mg/dL (21 Oct 2019 22:01)  POCT Blood Glucose.: 115 mg/dL (21 Oct 2019 21:01)  POCT Blood Glucose.: 145 mg/dL (21 Oct 2019 19:40)  POCT Blood Glucose.: 99 mg/dL (21 Oct 2019 18:35)  POCT Blood Glucose.: 126 mg/dL (21 Oct 2019 16:50)  POCT Blood Glucose.: 138 mg/dL (21 Oct 2019 16:00)  POCT Blood Glucose.: 161 mg/dL (21 Oct 2019 14:16)           RADIOLOGY: - Reviewed and analyzed   CXR: pending    HOSPITAL MEDICATIONS: All medications reviewed and analyzed  MEDICATIONS  (STANDING):  aspirin enteric coated 325 milliGRAM(s) Oral daily  cefuroxime  IVPB 1500 milliGRAM(s) IV Intermittent every 8 hours  chlorhexidine 2% Cloths 1 Application(s) Topical daily  dextrose 50% Injectable 50 milliLiter(s) IV Push every 15 minutes  dextrose 50% Injectable 25 milliLiter(s) IV Push every 15 minutes  docusate sodium 100 milliGRAM(s) Oral three times a day  escitalopram 10 milliGRAM(s) Oral daily  influenza   Vaccine 0.5 milliLiter(s) IntraMuscular once  insulin regular Infusion 2 Unit(s)/Hr (2 mL/Hr) IV Continuous <Continuous>  levothyroxine 75 MICROGram(s) Oral daily  niCARdipine Infusion 5 mG/Hr (25 mL/Hr) IV Continuous <Continuous>  ondansetron Injectable 4 milliGRAM(s) IV Push once  pantoprazole    Tablet 40 milliGRAM(s) Oral daily  polyethylene glycol 3350 17 Gram(s) Oral daily  potassium chloride  10 mEq/50 mL IVPB 10 milliEquivalent(s) IV Intermittent every 1 hour  potassium chloride  10 mEq/50 mL IVPB 10 milliEquivalent(s) IV Intermittent every 1 hour  potassium chloride  10 mEq/50 mL IVPB 10 milliEquivalent(s) IV Intermittent every 1 hour  potassium chloride  10 mEq/50 mL IVPB 10 milliEquivalent(s) IV Intermittent every 1 hour  senna 2 Tablet(s) Oral at bedtime  simvastatin 20 milliGRAM(s) Oral at bedtime  sodium chloride 0.9%. 1000 milliLiter(s) (10 mL/Hr) IV Continuous <Continuous>  sodium chloride 0.9%. 1000 milliLiter(s) (5 mL/Hr) IV Continuous <Continuous>  vancomycin  IVPB 750 milliGRAM(s) IV Intermittent every 12 hours    MEDICATIONS  (PRN):    Lines: RIJ cordis, and left radial a line 	    Physical Exam  Neuro: A+O x 3, non-focal, speech clear and intact  HEENT:  NCAT, PERRL, EOMI. No conjuctival edema or iIcterus, no thrush.  No ETT or NGT/OGT  Neck:  ROM intact, no JVD, no nodes or masses palpable, trachea midline, no tracheostomy  Pulm: CTA, good air entry, equal bilaterally, no rales/rhonchi/wheezing, no accessory muscle use noted  Chest:        mediastinal CT dressing intact and no air leak       +PW attatched to pacing box  CV: RRR, S1, S2. No murmurs, rubs, or gallops noted.  Abd: +BSx4. Soft, nontender, nondistended.  : schwartz in situ to bedside drainage  Ext: DELA CRUZ x 4, no edema, no cyanosis or clubbing, distal motor/neuro/circ intact  Skin: warm, dry, no rashes  Incisions: midsternal C/D/I/stable w/ dressing    PAST MEDICAL & SURGICAL HISTORY:  Hypothyroidism  Nonrheumatic mitral (valve) insufficiency  Nonrheumatic aortic valve insufficiency  Hyperlipidemia  Hypertension  Aortic aneurysm  S/P left knee surgery: Total knee replacement  S/P hernia repair: x2 Subjective: Extubated with incident overnight. No c/o incisional pain at this time. Denies CP, SOB, palpitations, N/V, other c/o.    T(C): 37.6 (10-22-19 @ 00:30), Max: 37.6 (10-21-19 @ 22:00)  HR: 80 (10-22-19 @ 00:45) (59 - 84)  BP: 117/68 (10-21-19 @ 20:20) (117/68 - 161/57)  ABP: 130/53 (10-22-19 @ 00:45) (102/55 - 145/77)  ABP(mean): 75 (10-22-19 @ 00:45) (72 - 102)  RR: 22 (10-22-19 @ 00:45) (8 - 22)  SpO2: 96% (10-22-19 @ 00:45) (96% - 100%)  Wt(kg): --  CVP(mm Hg): 6 (10-22-19 @ 00:45) (4 - 14)  CO: 3.9 (10-22-19 @ 00:45) (3.9 - 3.9)  CI: 2.6 (10-22-19 @ 00:45) (2.6 - 2.6)    I&O's Detail    21 Oct 2019 07:01  -  22 Oct 2019 01:04  --------------------------------------------------------  IN:    Frozen Plasma Cryoprecipitate Reduced: 113 mL    insulin regular Infusion: 12.5 mL    niCARdipine Infusion: 425 mL    Packed Red Blood Cells: 656 mL    propofol Infusion: 15 mL    sodium chloride 0.9%.: 65 mL    sodium chloride 0.9%.: 130 mL    Solution: 250 mL    Solution: 100 mL    Solution: 50 mL  Total IN: 1816.5 mL    OUT:    Chest Tube: 690 mL    Indwelling Catheter - Urethral: 680 mL    Nasoenteral Tube: 100 mL  Total OUT: 1470 mL    Total NET: 346.5 mL    LABS: All Lab data reviewed and analyzed                        9.6    9.45  )-----------( 158      ( 21 Oct 2019 12:50 )             27.2     10-21    142  |  106  |  14.0  ----------------------------<  135<H>  4.2   |  25.0  |  0.45<L>    Ca    9.9      21 Oct 2019 12:50  Mg     2.4     10-21    TPro  4.5<L>  /  Alb  3.0<L>  /  TBili  0.9  /  DBili  x   /  AST  34<H>  /  ALT  16  /  AlkPhos  48  10-21    PT/INR - ( 21 Oct 2019 12:50 )   PT: 14.0 sec;   INR: 1.21 ratio         PTT - ( 21 Oct 2019 12:50 )  PTT:30.4 sec      ABG - ( 21 Oct 2019 23:55 )  pH, Arterial: 7.33  pH, Blood: x     /  pCO2: 50    /  pO2: 118   / HCO3: 24    / Base Excess: -0.5  /  SaO2: 99          CAPILLARY BLOOD GLUCOSE      POCT Blood Glucose.: 131 mg/dL (21 Oct 2019 23:05)  POCT Blood Glucose.: 128 mg/dL (21 Oct 2019 22:01)  POCT Blood Glucose.: 115 mg/dL (21 Oct 2019 21:01)  POCT Blood Glucose.: 145 mg/dL (21 Oct 2019 19:40)  POCT Blood Glucose.: 99 mg/dL (21 Oct 2019 18:35)  POCT Blood Glucose.: 126 mg/dL (21 Oct 2019 16:50)  POCT Blood Glucose.: 138 mg/dL (21 Oct 2019 16:00)  POCT Blood Glucose.: 161 mg/dL (21 Oct 2019 14:16)           RADIOLOGY: - Reviewed and analyzed   CXR: pending    HOSPITAL MEDICATIONS: All medications reviewed and analyzed  MEDICATIONS  (STANDING):  aspirin enteric coated 325 milliGRAM(s) Oral daily  cefuroxime  IVPB 1500 milliGRAM(s) IV Intermittent every 8 hours  chlorhexidine 2% Cloths 1 Application(s) Topical daily  dextrose 50% Injectable 50 milliLiter(s) IV Push every 15 minutes  dextrose 50% Injectable 25 milliLiter(s) IV Push every 15 minutes  docusate sodium 100 milliGRAM(s) Oral three times a day  escitalopram 10 milliGRAM(s) Oral daily  influenza   Vaccine 0.5 milliLiter(s) IntraMuscular once  insulin regular Infusion 2 Unit(s)/Hr (2 mL/Hr) IV Continuous <Continuous>  levothyroxine 75 MICROGram(s) Oral daily  niCARdipine Infusion 5 mG/Hr (25 mL/Hr) IV Continuous <Continuous>  ondansetron Injectable 4 milliGRAM(s) IV Push once  pantoprazole    Tablet 40 milliGRAM(s) Oral daily  polyethylene glycol 3350 17 Gram(s) Oral daily  potassium chloride  10 mEq/50 mL IVPB 10 milliEquivalent(s) IV Intermittent every 1 hour  potassium chloride  10 mEq/50 mL IVPB 10 milliEquivalent(s) IV Intermittent every 1 hour  potassium chloride  10 mEq/50 mL IVPB 10 milliEquivalent(s) IV Intermittent every 1 hour  potassium chloride  10 mEq/50 mL IVPB 10 milliEquivalent(s) IV Intermittent every 1 hour  senna 2 Tablet(s) Oral at bedtime  simvastatin 20 milliGRAM(s) Oral at bedtime  sodium chloride 0.9%. 1000 milliLiter(s) (10 mL/Hr) IV Continuous <Continuous>  sodium chloride 0.9%. 1000 milliLiter(s) (5 mL/Hr) IV Continuous <Continuous>  vancomycin  IVPB 750 milliGRAM(s) IV Intermittent every 12 hours    MEDICATIONS  (PRN):    Lines: RIJ cordis, and left radial a line 	    Physical Exam  Neuro: A+O x 3, non-focal, speech clear and intact  HEENT:  NCAT, PERRL, EOMI. No conjuctival edema or iIcterus, no thrush.  No ETT or NGT/OGT  Neck:  ROM intact, no JVD, no nodes or masses palpable, trachea midline, no tracheostomy  Pulm: CTA, good air entry, equal bilaterally, no rales/rhonchi/wheezing, no accessory muscle use noted  Chest:        mediastinal CT dressing intact and no air leak       +PW attatched to pacing box  CV: RRR, S1, S2. No murmurs, rubs, or gallops noted.  Abd: +BSx4. Soft, nontender, nondistended.  : schwartz in situ to bedside drainage  Ext: DELA CRUZ x 4, no edema, no cyanosis or clubbing, distal motor/neuro/circ intact  Skin: warm, dry, no rashes  Incisions: midsternal C/D/I/stable w/ dressing    PAST MEDICAL & SURGICAL HISTORY:  Hypothyroidism  Nonrheumatic mitral (valve) insufficiency  Nonrheumatic aortic valve insufficiency  Hyperlipidemia  Hypertension  Aortic aneurysm  S/P left knee surgery: Total knee replacement  S/P hernia repair: x2

## 2019-10-22 NOTE — PHYSICAL THERAPY INITIAL EVALUATION ADULT - PERTINENT HX OF CURRENT PROBLEM, REHAB EVAL
75 y/o female with medical hx of HTN, HLD, valve disease, hypothyroidism seen today pre-op for Bentall for thoracic aortic aneurysm without rupture. Pt report stable aortic aneurysm but recently witnessed progressively worsening symptoms of fatigue, tiredness,  intermittent cough and was on quinapril for HTN that was  discontinued and started on Amlodipine, intermittent chest palpitation.  now s/p AVR

## 2019-10-22 NOTE — PHYSICAL THERAPY INITIAL EVALUATION ADULT - MANUAL MUSCLE TESTING RESULTS, REHAB EVAL
bilateral shoulder flex >3/5*, elbow flex >3/5*, hip flex 4-/5, knee ext 4/5, ankle df 4+/5 (* no resistance given due to surgery)

## 2019-10-22 NOTE — PHYSICAL THERAPY INITIAL EVALUATION ADULT - PASSIVE RANGE OF MOTION EXAMINATION, REHAB EVAL
bilateral shoulder flex limited to 90 degrees for testing due to surgery/bilateral upper extremity Passive ROM was WFL (within functional limits)/bilateral lower extremity Passive ROM was WFL (within functional limits)

## 2019-10-22 NOTE — PHYSICAL THERAPY INITIAL EVALUATION ADULT - ADDITIONAL COMMENTS
pt states she lives with her  in a 2-story house with 2 steps to enter (no rail) then 12 to second floor with rail. pt states she ambulates without a device, however has a cane, RW, shower chair, and raised toilet seat from her knee replacement.  retired and states he is able to assist pt upon discharge.

## 2019-10-22 NOTE — PHYSICAL THERAPY INITIAL EVALUATION ADULT - GENERAL OBSERVATIONS, REHAB EVAL
awake in recliner on room air, +right IJ, +schwartz, +chest tube, +VCDs, +pacer box, +telemonitor with

## 2019-10-22 NOTE — PHYSICAL THERAPY INITIAL EVALUATION ADULT - IMPAIRED TRANSFERS: SIT/STAND, REHAB EVAL
Next appt 4-2-19  Last appt 1-9-19    Refill request for   Disp Refills Start End    montelukast (SINGULAIR) 10 MG tablet 90 tablet 1 10/1/2018     Sig - Route: Take 1 tablet by mouth nightly. - Oral        Refused;      Disp Refills Start End    pantoprazole (PROTONIX) 40 MG tablet 90 tablet 0 3/4/2019     Sig: TAKE 1 TABLET BY MOUTH ONCE DAILY      Not due for refills
decreased strength/impaired balance

## 2019-10-22 NOTE — AIRWAY REMOVAL NOTE  ADULT & PEDS - ARTIFICAL AIRWAY REMOVAL COMMENTS
Pt was extubated as per PA order and was placed on 40% CAM no distress noted post extubation Sat/HR 98/79

## 2019-10-23 LAB
ANION GAP SERPL CALC-SCNC: 11 MMOL/L — SIGNIFICANT CHANGE UP (ref 5–17)
BUN SERPL-MCNC: 25 MG/DL — HIGH (ref 8–20)
CALCIUM SERPL-MCNC: 8.1 MG/DL — LOW (ref 8.6–10.2)
CHLORIDE SERPL-SCNC: 102 MMOL/L — SIGNIFICANT CHANGE UP (ref 98–107)
CO2 SERPL-SCNC: 24 MMOL/L — SIGNIFICANT CHANGE UP (ref 22–29)
CREAT SERPL-MCNC: 0.43 MG/DL — LOW (ref 0.5–1.3)
GLUCOSE BLDC GLUCOMTR-MCNC: 117 MG/DL — HIGH (ref 70–99)
GLUCOSE BLDC GLUCOMTR-MCNC: 118 MG/DL — HIGH (ref 70–99)
GLUCOSE BLDC GLUCOMTR-MCNC: 94 MG/DL — SIGNIFICANT CHANGE UP (ref 70–99)
GLUCOSE BLDC GLUCOMTR-MCNC: 99 MG/DL — SIGNIFICANT CHANGE UP (ref 70–99)
GLUCOSE SERPL-MCNC: 122 MG/DL — HIGH (ref 70–115)
HCT VFR BLD CALC: 32.3 % — LOW (ref 34.5–45)
HGB BLD-MCNC: 10.9 G/DL — LOW (ref 11.5–15.5)
MAGNESIUM SERPL-MCNC: 1.9 MG/DL — SIGNIFICANT CHANGE UP (ref 1.6–2.6)
MCHC RBC-ENTMCNC: 31.1 PG — SIGNIFICANT CHANGE UP (ref 27–34)
MCHC RBC-ENTMCNC: 33.7 GM/DL — SIGNIFICANT CHANGE UP (ref 32–36)
MCV RBC AUTO: 92.3 FL — SIGNIFICANT CHANGE UP (ref 80–100)
PLATELET # BLD AUTO: 117 K/UL — LOW (ref 150–400)
POTASSIUM SERPL-MCNC: 4.3 MMOL/L — SIGNIFICANT CHANGE UP (ref 3.5–5.3)
POTASSIUM SERPL-SCNC: 4.3 MMOL/L — SIGNIFICANT CHANGE UP (ref 3.5–5.3)
RBC # BLD: 3.5 M/UL — LOW (ref 3.8–5.2)
RBC # FLD: 14.5 % — SIGNIFICANT CHANGE UP (ref 10.3–14.5)
SODIUM SERPL-SCNC: 137 MMOL/L — SIGNIFICANT CHANGE UP (ref 135–145)
WBC # BLD: 14.38 K/UL — HIGH (ref 3.8–10.5)
WBC # FLD AUTO: 14.38 K/UL — HIGH (ref 3.8–10.5)

## 2019-10-23 PROCEDURE — 93010 ELECTROCARDIOGRAM REPORT: CPT

## 2019-10-23 PROCEDURE — 71045 X-RAY EXAM CHEST 1 VIEW: CPT | Mod: 26

## 2019-10-23 RX ORDER — SODIUM CHLORIDE 9 MG/ML
3 INJECTION INTRAMUSCULAR; INTRAVENOUS; SUBCUTANEOUS EVERY 8 HOURS
Refills: 0 | Status: DISCONTINUED | OUTPATIENT
Start: 2019-10-23 | End: 2019-10-26

## 2019-10-23 RX ORDER — METOPROLOL TARTRATE 50 MG
25 TABLET ORAL EVERY 12 HOURS
Refills: 0 | Status: DISCONTINUED | OUTPATIENT
Start: 2019-10-23 | End: 2019-10-26

## 2019-10-23 RX ORDER — MAGNESIUM SULFATE 500 MG/ML
2 VIAL (ML) INJECTION ONCE
Refills: 0 | Status: COMPLETED | OUTPATIENT
Start: 2019-10-23 | End: 2019-10-23

## 2019-10-23 RX ADMIN — Medication 100 MILLIGRAM(S): at 05:25

## 2019-10-23 RX ADMIN — PANTOPRAZOLE SODIUM 40 MILLIGRAM(S): 20 TABLET, DELAYED RELEASE ORAL at 11:43

## 2019-10-23 RX ADMIN — Medication 25 MILLIGRAM(S): at 18:55

## 2019-10-23 RX ADMIN — POLYETHYLENE GLYCOL 3350 17 GRAM(S): 17 POWDER, FOR SOLUTION ORAL at 11:43

## 2019-10-23 RX ADMIN — Medication 50 GRAM(S): at 05:24

## 2019-10-23 RX ADMIN — ESCITALOPRAM OXALATE 10 MILLIGRAM(S): 10 TABLET, FILM COATED ORAL at 11:43

## 2019-10-23 RX ADMIN — Medication 100 MILLIGRAM(S): at 21:43

## 2019-10-23 RX ADMIN — SODIUM CHLORIDE 3 MILLILITER(S): 9 INJECTION INTRAMUSCULAR; INTRAVENOUS; SUBCUTANEOUS at 21:34

## 2019-10-23 RX ADMIN — Medication 325 MILLIGRAM(S): at 11:43

## 2019-10-23 RX ADMIN — Medication 25 MILLIGRAM(S): at 06:33

## 2019-10-23 RX ADMIN — Medication 100 MILLIGRAM(S): at 15:51

## 2019-10-23 RX ADMIN — ENOXAPARIN SODIUM 40 MILLIGRAM(S): 100 INJECTION SUBCUTANEOUS at 11:43

## 2019-10-23 RX ADMIN — Medication 100 MILLIGRAM(S): at 09:00

## 2019-10-23 RX ADMIN — Medication 250 MILLIGRAM(S): at 09:00

## 2019-10-23 RX ADMIN — Medication 75 MICROGRAM(S): at 05:25

## 2019-10-23 RX ADMIN — SIMVASTATIN 20 MILLIGRAM(S): 20 TABLET, FILM COATED ORAL at 21:43

## 2019-10-23 RX ADMIN — SODIUM CHLORIDE 3 MILLILITER(S): 9 INJECTION INTRAMUSCULAR; INTRAVENOUS; SUBCUTANEOUS at 13:18

## 2019-10-23 RX ADMIN — SENNA PLUS 2 TABLET(S): 8.6 TABLET ORAL at 21:43

## 2019-10-23 RX ADMIN — Medication 100 MILLIGRAM(S): at 00:00

## 2019-10-23 NOTE — PROGRESS NOTE ADULT - SUBJECTIVE AND OBJECTIVE BOX
POD #2 s/p Ascending aortic aneurysm repair with 32 mm decron graft & AVR with 23 mm villegas valve replacement     Surgeon: Dr. Herrera    Subjective: Patient lying in bed in no acute distress. Alert to person, place, and date, but confused about time of morning. No c/o incisional pain at this time. Denies lightheadedness, CP, SOB, palpitations, N/V, other c/o.      T(C): 36.9 (10-23-19 @ 00:00), Max: 37.4 (10-22-19 @ 07:00)  HR: 83 (10-23-19 @ 01:00) (72 - 86)  BP: 145/76 (10-23-19 @ 01:00) (121/63 - 150/73)  ABP: 121/71 (10-22-19 @ 08:00) (99/73 - 121/71)  ABP(mean): 110 (10-22-19 @ 08:00) (77 - 110)  RR: 25 (10-23-19 @ 01:00) (12 - 25)  SpO2: 95% (10-23-19 @ 01:00) (90% - 98%)  CVP(mm Hg): 5 (10-23-19 @ 01:00) (4 - 17)  CO: 3.2 (10-22-19 @ 06:00) (2.7 - 3.2)  CI: 2.1 (10-22-19 @ 06:00) (1.9 - 2.1)      I&O's Detail    21 Oct 2019 07:01  -  22 Oct 2019 07:00  --------------------------------------------------------  IN:    Albumin 5%  - 250 mL: 250 mL    Frozen Plasma Cryoprecipitate Reduced: 113 mL    insulin regular Infusion: 15.5 mL    niCARdipine Infusion: 537.5 mL    Packed Red Blood Cells: 656 mL    propofol Infusion: 15 mL    sodium chloride 0.9%.: 100 mL    sodium chloride 0.9%.: 200 mL    Solution: 250 mL    Solution: 100 mL    Solution: 250 mL  Total IN: 2487 mL    OUT:    Chest Tube: 820 mL    Indwelling Catheter - Urethral: 950 mL    Nasoenteral Tube: 100 mL  Total OUT: 1870 mL    Total NET: 617 mL      22 Oct 2019 07:01  -  23 Oct 2019 03:27  --------------------------------------------------------  IN:    Oral Fluid: 950 mL    sodium chloride 0.9%.: 190 mL    sodium chloride 0.9%.: 95 mL    Solution: 250 mL    Solution: 200 mL  Total IN: 1685 mL    OUT:    Chest Tube: 250 mL    Indwelling Catheter - Urethral: 975 mL  Total OUT: 1225 mL    Total NET: 460 mL        LABS: All Lab data reviewed and analyzed                        12.8   13.88 )-----------( 174      ( 22 Oct 2019 03:40 )             37.2     10-22    142  |  107  |  22.0<H>  ----------------------------<  132<H>  5.2   |  24.0  |  0.55    Ca    8.0<L>      22 Oct 2019 03:40  Mg     2.0     10-22    TPro  5.6<L>  /  Alb  3.8  /  TBili  1.7  /  DBili  x   /  AST  49<H>  /  ALT  19  /  AlkPhos  52  10-22    PT/INR - ( 22 Oct 2019 03:40 )   PT: 12.8 sec;   INR: 1.11 ratio         PTT - ( 22 Oct 2019 03:40 )  PTT:29.1 sec  CARDIAC MARKERS ( 22 Oct 2019 03:40 )   U/L / CKMB16.7 ng/mL / Troponin T0.44 ng/mL /      ABG - ( 21 Oct 2019 23:55 )  pH, Arterial: 7.33  pH, Blood: x     /  pCO2: 50    /  pO2: 118   / HCO3: 24    / Base Excess: -0.5  /  SaO2: 99          POCT Blood Glucose.: 117 mg/dL (22 Oct 2019 22:06)  POCT Blood Glucose.: 121 mg/dL (22 Oct 2019 16:35)  POCT Blood Glucose.: 140 mg/dL (22 Oct 2019 11:17)  POCT Blood Glucose.: 112 mg/dL (22 Oct 2019 09:30)  POCT Blood Glucose.: 38 mg/dL (22 Oct 2019 09:28)  POCT Blood Glucose.: 87 mg/dL (22 Oct 2019 07:08)  POCT Blood Glucose.: 127 mg/dL (22 Oct 2019 05:17)           RADIOLOGY: - Reviewed and analyzed     < from: Xray Chest 1 View- PORTABLE-Routine (10.22.19 @ 06:15) >  IMPRESSION:  Sternotomy wires, heart valve, right IJ sheath, mediastinal drains again noted. ET tube and feeding tube removed.  New small left pleural effusion. Trace right pleural effusion.  Heart size cannot be accurately assessed in this projection.    < end of copied text >      C10/23 CXR: pending      HOSPITAL MEDICATIONS: All medications reviewed and analyzed  MEDICATIONS  (STANDING):  aspirin enteric coated 325 milliGRAM(s) Oral daily  cefuroxime  IVPB 1500 milliGRAM(s) IV Intermittent every 8 hours  chlorhexidine 2% Cloths 1 Application(s) Topical daily  docusate sodium 100 milliGRAM(s) Oral three times a day  enoxaparin Injectable 40 milliGRAM(s) SubCutaneous daily  escitalopram 10 milliGRAM(s) Oral daily  insulin lispro (HumaLOG) corrective regimen sliding scale   SubCutaneous Before meals and at bedtime  levothyroxine 75 MICROGram(s) Oral daily  pantoprazole    Tablet 40 milliGRAM(s) Oral daily  polyethylene glycol 3350 17 Gram(s) Oral daily  potassium chloride  10 mEq/50 mL IVPB 10 milliEquivalent(s) IV Intermittent every 1 hour  potassium chloride  10 mEq/50 mL IVPB 10 milliEquivalent(s) IV Intermittent every 1 hour  senna 2 Tablet(s) Oral at bedtime  simvastatin 20 milliGRAM(s) Oral at bedtime  sodium chloride 0.9%. 1000 milliLiter(s) (10 mL/Hr) IV Continuous   sodium chloride 0.9%. 1000 milliLiter(s) (5 mL/Hr) IV Continuous   vancomycin  IVPB 750 milliGRAM(s) IV Intermittent every 12 hours  	    Physical Exam  Neuro: A+O to person, place, and date, confused on time, speech clear and intact  HEENT:  NCAT, PERRL, EOMI. No conjuctival edema or iIcterus, no thrush.  No ETT or NGT/OGT  Neck:  ROM intact, no JVD, no nodes or masses palpable, trachea midline, no tracheostomy  Pulm: Decreased BSs at bases bilaterally, no rales/rhonchi/wheezing, no accessory muscle use noted  Chest:        mediastinal CT dressing intact and no air leak       +PW attached to pacing box 40/10/2.0 sensitivity 2.5  CV: RRR, S1, S2. No murmurs, rubs, or gallops noted.  Abd: +BSx4. Soft, nontender, nondistended.  : schwartz in situ to bedside drainage  Ext: DELA CRUZ x 4, no edema, no cyanosis or clubbing, distal motor/neuro/circ intact  Skin: warm, dry.  Incisions: midsternal C/D/I/stable w/ mepilex dressing  Lines: RIJ cordis

## 2019-10-23 NOTE — PROGRESS NOTE ADULT - ASSESSMENT
74 year old female with PMH of HTN, HLD, hypothyroidism, moderate AI, and known stable aortic aneurysm with progressively worsening symptoms of fatigue, tiredness, intermittent cough and intermittent chest palpitation presented 10/21 as SDA for Ascending aortic aneurysm repair with 32 mm decron graft & AVR with 23 mm villegas valve replacement native coronary arteries and root remain- root sparing procedure with AVR.

## 2019-10-24 DIAGNOSIS — J90 PLEURAL EFFUSION, NOT ELSEWHERE CLASSIFIED: ICD-10-CM

## 2019-10-24 DIAGNOSIS — D68.9 COAGULATION DEFECT, UNSPECIFIED: ICD-10-CM

## 2019-10-24 DIAGNOSIS — D62 ACUTE POSTHEMORRHAGIC ANEMIA: ICD-10-CM

## 2019-10-24 LAB
ANION GAP SERPL CALC-SCNC: 13 MMOL/L — SIGNIFICANT CHANGE UP (ref 5–17)
BUN SERPL-MCNC: 24 MG/DL — HIGH (ref 8–20)
CALCIUM SERPL-MCNC: 8.6 MG/DL — SIGNIFICANT CHANGE UP (ref 8.6–10.2)
CHLORIDE SERPL-SCNC: 96 MMOL/L — LOW (ref 98–107)
CO2 SERPL-SCNC: 28 MMOL/L — SIGNIFICANT CHANGE UP (ref 22–29)
CREAT SERPL-MCNC: 0.39 MG/DL — LOW (ref 0.5–1.3)
GLUCOSE BLDC GLUCOMTR-MCNC: 117 MG/DL — HIGH (ref 70–99)
GLUCOSE BLDC GLUCOMTR-MCNC: 66 MG/DL — LOW (ref 70–99)
GLUCOSE BLDC GLUCOMTR-MCNC: 87 MG/DL — SIGNIFICANT CHANGE UP (ref 70–99)
GLUCOSE BLDC GLUCOMTR-MCNC: 99 MG/DL — SIGNIFICANT CHANGE UP (ref 70–99)
GLUCOSE SERPL-MCNC: 91 MG/DL — SIGNIFICANT CHANGE UP (ref 70–115)
HCT VFR BLD CALC: 34.5 % — SIGNIFICANT CHANGE UP (ref 34.5–45)
HGB BLD-MCNC: 11.6 G/DL — SIGNIFICANT CHANGE UP (ref 11.5–15.5)
MAGNESIUM SERPL-MCNC: 2 MG/DL — SIGNIFICANT CHANGE UP (ref 1.6–2.6)
MCHC RBC-ENTMCNC: 31.3 PG — SIGNIFICANT CHANGE UP (ref 27–34)
MCHC RBC-ENTMCNC: 33.6 GM/DL — SIGNIFICANT CHANGE UP (ref 32–36)
MCV RBC AUTO: 93 FL — SIGNIFICANT CHANGE UP (ref 80–100)
PLATELET # BLD AUTO: 119 K/UL — LOW (ref 150–400)
POTASSIUM SERPL-MCNC: 3.6 MMOL/L — SIGNIFICANT CHANGE UP (ref 3.5–5.3)
POTASSIUM SERPL-SCNC: 3.6 MMOL/L — SIGNIFICANT CHANGE UP (ref 3.5–5.3)
RBC # BLD: 3.71 M/UL — LOW (ref 3.8–5.2)
RBC # FLD: 13.3 % — SIGNIFICANT CHANGE UP (ref 10.3–14.5)
SODIUM SERPL-SCNC: 137 MMOL/L — SIGNIFICANT CHANGE UP (ref 135–145)
SURGICAL PATHOLOGY STUDY: SIGNIFICANT CHANGE UP
WBC # BLD: 12.83 K/UL — HIGH (ref 3.8–10.5)
WBC # FLD AUTO: 12.83 K/UL — HIGH (ref 3.8–10.5)

## 2019-10-24 PROCEDURE — 71045 X-RAY EXAM CHEST 1 VIEW: CPT | Mod: 26,77

## 2019-10-24 PROCEDURE — 71045 X-RAY EXAM CHEST 1 VIEW: CPT | Mod: 26

## 2019-10-24 PROCEDURE — 32557 INSERT CATH PLEURA W/ IMAGE: CPT | Mod: LT,78

## 2019-10-24 RX ORDER — FUROSEMIDE 40 MG
20 TABLET ORAL ONCE
Refills: 0 | Status: COMPLETED | OUTPATIENT
Start: 2019-10-24 | End: 2019-10-24

## 2019-10-24 RX ORDER — LIDOCAINE HCL 20 MG/ML
10 VIAL (ML) INJECTION ONCE
Refills: 0 | Status: COMPLETED | OUTPATIENT
Start: 2019-10-24 | End: 2019-10-24

## 2019-10-24 RX ORDER — POTASSIUM CHLORIDE 20 MEQ
40 PACKET (EA) ORAL EVERY 4 HOURS
Refills: 0 | Status: COMPLETED | OUTPATIENT
Start: 2019-10-24 | End: 2019-10-24

## 2019-10-24 RX ORDER — FUROSEMIDE 40 MG
20 TABLET ORAL DAILY
Refills: 0 | Status: DISCONTINUED | OUTPATIENT
Start: 2019-10-25 | End: 2019-10-26

## 2019-10-24 RX ORDER — ACETAMINOPHEN 500 MG
750 TABLET ORAL ONCE
Refills: 0 | Status: COMPLETED | OUTPATIENT
Start: 2019-10-24 | End: 2019-10-24

## 2019-10-24 RX ORDER — ACETAMINOPHEN 500 MG
650 TABLET ORAL EVERY 6 HOURS
Refills: 0 | Status: DISCONTINUED | OUTPATIENT
Start: 2019-10-24 | End: 2019-10-26

## 2019-10-24 RX ADMIN — Medication 25 MILLIGRAM(S): at 05:40

## 2019-10-24 RX ADMIN — Medication 750 MILLIGRAM(S): at 16:35

## 2019-10-24 RX ADMIN — Medication 25 MILLIGRAM(S): at 19:31

## 2019-10-24 RX ADMIN — PANTOPRAZOLE SODIUM 40 MILLIGRAM(S): 20 TABLET, DELAYED RELEASE ORAL at 09:39

## 2019-10-24 RX ADMIN — Medication 75 MICROGRAM(S): at 05:40

## 2019-10-24 RX ADMIN — SODIUM CHLORIDE 3 MILLILITER(S): 9 INJECTION INTRAMUSCULAR; INTRAVENOUS; SUBCUTANEOUS at 05:39

## 2019-10-24 RX ADMIN — Medication 40 MILLIEQUIVALENT(S): at 19:31

## 2019-10-24 RX ADMIN — SODIUM CHLORIDE 3 MILLILITER(S): 9 INJECTION INTRAMUSCULAR; INTRAVENOUS; SUBCUTANEOUS at 22:32

## 2019-10-24 RX ADMIN — Medication 300 MILLIGRAM(S): at 16:05

## 2019-10-24 RX ADMIN — Medication 10 MILLILITER(S): at 13:59

## 2019-10-24 RX ADMIN — SODIUM CHLORIDE 3 MILLILITER(S): 9 INJECTION INTRAMUSCULAR; INTRAVENOUS; SUBCUTANEOUS at 14:00

## 2019-10-24 RX ADMIN — Medication 100 MILLIGRAM(S): at 22:28

## 2019-10-24 RX ADMIN — Medication 40 MILLIEQUIVALENT(S): at 16:05

## 2019-10-24 RX ADMIN — ESCITALOPRAM OXALATE 10 MILLIGRAM(S): 10 TABLET, FILM COATED ORAL at 11:08

## 2019-10-24 RX ADMIN — Medication 325 MILLIGRAM(S): at 11:08

## 2019-10-24 RX ADMIN — Medication 650 MILLIGRAM(S): at 22:25

## 2019-10-24 RX ADMIN — SIMVASTATIN 20 MILLIGRAM(S): 20 TABLET, FILM COATED ORAL at 22:28

## 2019-10-24 RX ADMIN — Medication 100 MILLIGRAM(S): at 05:40

## 2019-10-24 RX ADMIN — Medication 100 MILLIGRAM(S): at 16:06

## 2019-10-24 RX ADMIN — SENNA PLUS 2 TABLET(S): 8.6 TABLET ORAL at 22:28

## 2019-10-24 RX ADMIN — POLYETHYLENE GLYCOL 3350 17 GRAM(S): 17 POWDER, FOR SOLUTION ORAL at 09:39

## 2019-10-24 RX ADMIN — Medication 20 MILLIGRAM(S): at 05:40

## 2019-10-24 NOTE — PROGRESS NOTE ADULT - ASSESSMENT
74 year old female with PMH of HTN, HLD, hypothyroidism, moderate AI, and known stable aortic aneurysm with progressively worsening symptoms of fatigue, tiredness, intermittent cough and intermittent chest palpitation     Procedure: Elective Ascending aortic aneurysm repair with 32 mm decron graft & AVR with 23 mm villegas valve replacement (root sparing)    Postopertive course: initial coagulopathy and acute blood loss anemia requiring blood and product transfusion. PVC and bilateral pleural effusions on 10/24 with hypoxia overnight requiring right chest tube placement and IV diuretics.

## 2019-10-24 NOTE — PROGRESS NOTE ADULT - SUBJECTIVE AND OBJECTIVE BOX
Subjective: "I felt a little discomfort" NAD denies CP, SOB.     VITAL SIGNS  Vital Signs Last 24 Hrs  T(C): 36.8 (10-24-19 @ 09:52), Max: 37.1 (10-23-19 @ 16:38)  T(F): 98.2 (10-24-19 @ 09:52), Max: 98.8 (10-23-19 @ 16:38)  HR: 71 (10-24-19 @ 09:52) (63 - 87)  BP: 94/64 (10-24-19 @ 09:52) (94/64 - 168/80)  RR: 18 (10-24-19 @ 09:52) (18 - 24)  SpO2: 97% (10-24-19 @ 09:52) (93% - 97%)  on RA              Telemetry/Alarms:  SR BBB, desats overnight to mid 80s%  LVEF: 40%    MEDICATIONS  acetaminophen  IVPB .. 750 milliGRAM(s) IV Intermittent once  aspirin enteric coated 325 milliGRAM(s) Oral daily  docusate sodium 100 milliGRAM(s) Oral three times a day  enoxaparin Injectable 40 milliGRAM(s) SubCutaneous daily  escitalopram 10 milliGRAM(s) Oral daily  insulin lispro (HumaLOG) corrective regimen sliding scale   SubCutaneous Before meals and at bedtime  levothyroxine 75 MICROGram(s) Oral daily  metoprolol tartrate 25 milliGRAM(s) Oral every 12 hours  pantoprazole    Tablet 40 milliGRAM(s) Oral daily  polyethylene glycol 3350 17 Gram(s) Oral daily  potassium chloride    Tablet ER 40 milliEquivalent(s) Oral every 4 hours  senna 2 Tablet(s) Oral at bedtime  simvastatin 20 milliGRAM(s) Oral at bedtime  sodium chloride 0.9% lock flush 3 milliLiter(s) IV Push every 8 hours      PHYSICAL EXAM  General: well nourished, well developed, no acute distress  Neurology: alert and oriented x 3, nonfocal, no gross deficits  Respiratory: decreased throughout  CV: regular rate and rhythm, normal S1, S2 + systolic murmur  Abdomen: soft, nontender, nondistended, positive bowel sounds, last bowel movement today  Extremities: warm, well perfused. trace edema. + DP pulses  Incisions: midline sternal incision, + mepilex, c/d/i. sternum stable.  Chest tubes: chest tube placed right posterior chest, see note      10-23 @ 07:  -  10-24 @ 07:00  --------------------------------------------------------  IN: 300 mL / OUT: 750 mL / NET: -450 mL    10-24 @ 07:  -  10-24 @ 14:17  --------------------------------------------------------  IN: 480 mL / OUT: 100 mL / NET: 380 mL        Weights:  Daily     Daily Weight in k (24 Oct 2019 06:02)  Admit Wt: Drug Dosing Weight  Height (cm): 149.86 (21 Oct 2019 06:01)  Weight (kg): 52 (21 Oct 2019 06:01)  BMI (kg/m2): 23.2 (21 Oct 2019 06:01)  BSA (m2): 1.46 (21 Oct 2019 06:01)    All laboratory results, radiology and medications reviewed.    LABS  10-24    137  |  96<L>  |  24.0<H>  ----------------------------<  91  3.6   |  28.0  |  0.39<L>    Ca    8.6      24 Oct 2019 06:49  Mg     2.0     10-24                                   11.6   12.83 )-----------( 119      ( 24 Oct 2019 06:49 )             34.5              CAPILLARY BLOOD GLUCOSE      POCT Blood Glucose.: 87 mg/dL (24 Oct 2019 12:13)  POCT Blood Glucose.: 99 mg/dL (24 Oct 2019 08:45)  POCT Blood Glucose.: 66 mg/dL (24 Oct 2019 08:15)  POCT Blood Glucose.: 99 mg/dL (23 Oct 2019 21:20)  POCT Blood Glucose.: 94 mg/dL (23 Oct 2019 16:22)           Today's CXR: < from: Xray Chest 1 View- PORTABLE-Routine (10.24.19 @ 06:06) >    FINDINGS:   No previous examinations are available for review.    The lungs  show a moderate RIGHT pleural effusion and/or basilar   atelectasis. RIGHT upper lobe and LEFT lung parenchyma remain clear.        The heart and mediastinum are within normal limits following cardiac    surgery.     Visualized osseous structures are intact.        IMPRESSION:    Status post open heart surgery.   Moderate RIGHT pleural effusion and/or basilar airspace   consolidation/atelectasis.    < end of copied text >      Today's EKG:   < from: 12 Lead ECG (10.22.19 @ 01:35) >    Diagnosis Line Normal sinus rhythm  Left bundle branch block  Abnormal ECG    < end of copied text >    PAST MEDICAL & SURGICAL HISTORY:  Hypothyroidism  Nonrheumatic mitral (valve) insufficiency  Nonrheumatic aortic valve insufficiency  Hyperlipidemia  Hypertension  Aortic aneurysm  S/P left knee surgery: Total knee replacement  S/P hernia repair: x2

## 2019-10-24 NOTE — CHART NOTE - NSCHARTNOTEFT_GEN_A_CORE
Made aware of decreasing SpO2 by RN this am. Patient transferred to [address] [place] [address] last night from [facility]. Earlier in the night patient with SpO2 97% on room air. Now SpO2 decreasing to 80s. Patient admits to very mild difficulty breathing lying flat in bed, otherwise patient feeling OK. Denies fevers, coughing, sputum production, CP, palpitations, or uncontrolled pain. Admits to not using her incentive spirometer.     Constitutional: NAD lying flat in bed, well developed, well nourished  Neuro: A+O x 3, non-focal, speech clear and intact  HEENT: NC/AT, oral mucosa pink and moist  Neck: supple, no JVD  CV: RRR, +S1S2  Pulm/chest: +Mild rales to right lower lobe, otherwise clear, no accessory muscle use noted  Abd: soft, NT, ND, +BS  Ext: DELA CRUZ x 4, no C/C/E  Skin: warm, well perfused  Psych: calm, appropriate affect    Plan: AM CXR ordered. Patient placed on 2L O2 via NC with SpO2 94% currently. Lasix 20mg IV push ordered. Patient educated on proper use and importance of incentive spirometer. CDBE encouraged. Continuous tele and O2 monitoring. RN aware of plan. Will continue to monitor.

## 2019-10-25 ENCOUNTER — TRANSCRIPTION ENCOUNTER (OUTPATIENT)
Age: 74
End: 2019-10-25

## 2019-10-25 LAB
ANION GAP SERPL CALC-SCNC: 11 MMOL/L — SIGNIFICANT CHANGE UP (ref 5–17)
BUN SERPL-MCNC: 27 MG/DL — HIGH (ref 8–20)
CALCIUM SERPL-MCNC: 8.5 MG/DL — LOW (ref 8.6–10.2)
CHLORIDE SERPL-SCNC: 100 MMOL/L — SIGNIFICANT CHANGE UP (ref 98–107)
CO2 SERPL-SCNC: 27 MMOL/L — SIGNIFICANT CHANGE UP (ref 22–29)
CREAT SERPL-MCNC: 0.42 MG/DL — LOW (ref 0.5–1.3)
GLUCOSE SERPL-MCNC: 106 MG/DL — SIGNIFICANT CHANGE UP (ref 70–115)
HCT VFR BLD CALC: 33.1 % — LOW (ref 34.5–45)
HGB BLD-MCNC: 11.2 G/DL — LOW (ref 11.5–15.5)
MAGNESIUM SERPL-MCNC: 2 MG/DL — SIGNIFICANT CHANGE UP (ref 1.6–2.6)
MCHC RBC-ENTMCNC: 30.8 PG — SIGNIFICANT CHANGE UP (ref 27–34)
MCHC RBC-ENTMCNC: 33.8 GM/DL — SIGNIFICANT CHANGE UP (ref 32–36)
MCV RBC AUTO: 90.9 FL — SIGNIFICANT CHANGE UP (ref 80–100)
PLATELET # BLD AUTO: 125 K/UL — LOW (ref 150–400)
POTASSIUM SERPL-MCNC: 3.8 MMOL/L — SIGNIFICANT CHANGE UP (ref 3.5–5.3)
POTASSIUM SERPL-SCNC: 3.8 MMOL/L — SIGNIFICANT CHANGE UP (ref 3.5–5.3)
RBC # BLD: 3.64 M/UL — LOW (ref 3.8–5.2)
RBC # FLD: 13 % — SIGNIFICANT CHANGE UP (ref 10.3–14.5)
SODIUM SERPL-SCNC: 138 MMOL/L — SIGNIFICANT CHANGE UP (ref 135–145)
WBC # BLD: 11.38 K/UL — HIGH (ref 3.8–10.5)
WBC # FLD AUTO: 11.38 K/UL — HIGH (ref 3.8–10.5)

## 2019-10-25 PROCEDURE — 71045 X-RAY EXAM CHEST 1 VIEW: CPT | Mod: 26

## 2019-10-25 PROCEDURE — 71045 X-RAY EXAM CHEST 1 VIEW: CPT | Mod: 26,77

## 2019-10-25 RX ORDER — COLCHICINE 0.6 MG
0.6 TABLET ORAL
Refills: 0 | Status: DISCONTINUED | OUTPATIENT
Start: 2019-10-25 | End: 2019-10-26

## 2019-10-25 RX ORDER — POTASSIUM CHLORIDE 20 MEQ
20 PACKET (EA) ORAL ONCE
Refills: 0 | Status: COMPLETED | OUTPATIENT
Start: 2019-10-25 | End: 2019-10-25

## 2019-10-25 RX ORDER — ENOXAPARIN SODIUM 100 MG/ML
40 INJECTION SUBCUTANEOUS DAILY
Refills: 0 | Status: DISCONTINUED | OUTPATIENT
Start: 2019-10-25 | End: 2019-10-26

## 2019-10-25 RX ADMIN — Medication 20 MILLIGRAM(S): at 06:07

## 2019-10-25 RX ADMIN — Medication 25 MILLIGRAM(S): at 17:13

## 2019-10-25 RX ADMIN — POLYETHYLENE GLYCOL 3350 17 GRAM(S): 17 POWDER, FOR SOLUTION ORAL at 10:46

## 2019-10-25 RX ADMIN — SODIUM CHLORIDE 3 MILLILITER(S): 9 INJECTION INTRAMUSCULAR; INTRAVENOUS; SUBCUTANEOUS at 22:43

## 2019-10-25 RX ADMIN — Medication 100 MILLIGRAM(S): at 06:01

## 2019-10-25 RX ADMIN — SIMVASTATIN 20 MILLIGRAM(S): 20 TABLET, FILM COATED ORAL at 22:39

## 2019-10-25 RX ADMIN — SODIUM CHLORIDE 3 MILLILITER(S): 9 INJECTION INTRAMUSCULAR; INTRAVENOUS; SUBCUTANEOUS at 06:08

## 2019-10-25 RX ADMIN — Medication 75 MICROGRAM(S): at 06:01

## 2019-10-25 RX ADMIN — Medication 325 MILLIGRAM(S): at 10:46

## 2019-10-25 RX ADMIN — Medication 0.6 MILLIGRAM(S): at 17:13

## 2019-10-25 RX ADMIN — SODIUM CHLORIDE 3 MILLILITER(S): 9 INJECTION INTRAMUSCULAR; INTRAVENOUS; SUBCUTANEOUS at 14:38

## 2019-10-25 RX ADMIN — Medication 650 MILLIGRAM(S): at 10:47

## 2019-10-25 RX ADMIN — ENOXAPARIN SODIUM 40 MILLIGRAM(S): 100 INJECTION SUBCUTANEOUS at 22:39

## 2019-10-25 RX ADMIN — Medication 650 MILLIGRAM(S): at 11:51

## 2019-10-25 RX ADMIN — Medication 20 MILLIEQUIVALENT(S): at 10:46

## 2019-10-25 RX ADMIN — Medication 0.6 MILLIGRAM(S): at 11:49

## 2019-10-25 RX ADMIN — ESCITALOPRAM OXALATE 10 MILLIGRAM(S): 10 TABLET, FILM COATED ORAL at 10:46

## 2019-10-25 RX ADMIN — Medication 25 MILLIGRAM(S): at 06:01

## 2019-10-25 RX ADMIN — PANTOPRAZOLE SODIUM 40 MILLIGRAM(S): 20 TABLET, DELAYED RELEASE ORAL at 10:46

## 2019-10-25 NOTE — PROGRESS NOTE ADULT - SUBJECTIVE AND OBJECTIVE BOX
Subjective: Pt sitting up in bed.  Denies CP or SOB.  NAD noted.      Tele:  SR                        T(F): 99.1 (10-25-19 @ 10:03), Max: 99.5 (10-25-19 @ 05:44)  HR: 68 (10-25-19 @ 10:03) (60 - 75)  BP: 138/82 (10-25-19 @ 10:03) (129/67 - 160/90)  RR: 16 (10-25-19 @ 10:03) (16 - 18)  SpO2: 97% (10-25-19 @ 10:03) (92% - 97%) on room air at rest.    Daily     Daily Weight in k.4 (25 Oct 2019 06:46)    LV EF: 40%    Allergies:  codeine (Vomiting)      10-25    138  |  100  |  27.0<H>  ----------------------------<  106  3.8   |  27.0  |  0.42<L>    Ca    8.5<L>      25 Oct 2019 06:05  Mg     2.0     10-25                                 11.2   11.38 )-----------( 125      ( 25 Oct 2019 06:05 )             33.1               CAPILLARY BLOOD GLUCOSE  POCT Blood Glucose.: 117 mg/dL (24 Oct 2019 17:09)  POCT Blood Glucose.: 87 mg/dL (24 Oct 2019 12:13)           CXR: < from: Xray Chest 1 View- PORTABLE-Routine (10.25.19 @ 06:50) >  Impression:  Small left apical pneumothorax not seen on the prior study, otherwise   stable.  ISSAC KENDRICK M.D., ATTENDING RADIOLOGIST  This document has been electronically signed.Oct 25 2019  8:14AM        < end of copied text >      I&O's Detail    24 Oct 2019 07:01  -  25 Oct 2019 07:00  --------------------------------------------------------  IN:    Oral Fluid: 480 mL  Total IN: 480 mL    OUT:    Chest Tube: 750 mL    Chest Tube: 490 mL    Voided: 800 mL  Total OUT: 2040 mL    Total NET: -1560 mL      25 Oct 2019 07:01  -  25 Oct 2019 11:20  --------------------------------------------------------  IN:  Total IN: 0 mL    OUT:    Voided: 200 mL  Total OUT: 200 mL    Total NET: -200 mL      CHEST TUBE:  [ x] YES [ ] NO  OUTPUT:  Left pigtail 110ml overnight and 490 ml ototal over the last 24 hours    AIR LEAKS:  [ ] YES [x ] NO      LATRELL DRAIN:   [ ] YES [x ] NO  OUTPUT:     per 24 hours    EPICARDIAL WIRES:  [ ] YES [x ] NO      BOWEL MOVEMENT:  [x ] YES [ ] NO        Active Medications:  acetaminophen   Tablet .. 650 milliGRAM(s) Oral every 6 hours PRN  aspirin enteric coated 325 milliGRAM(s) Oral daily  colchicine 0.6 milliGRAM(s) Oral two times a day  docusate sodium 100 milliGRAM(s) Oral three times a day  escitalopram 10 milliGRAM(s) Oral daily  furosemide    Tablet 20 milliGRAM(s) Oral daily  levothyroxine 75 MICROGram(s) Oral daily  metoprolol tartrate 25 milliGRAM(s) Oral every 12 hours  pantoprazole    Tablet 40 milliGRAM(s) Oral daily  polyethylene glycol 3350 17 Gram(s) Oral daily  senna 2 Tablet(s) Oral at bedtime  simvastatin 20 milliGRAM(s) Oral at bedtime  sodium chloride 0.9% lock flush 3 milliLiter(s) IV Push every 8 hours      Physical Exam:    Neuro: AAOX3.  Non focal.    Pulm: Diminished BLL.  Left pigtail to waterseal.  No crepitus and no airleak.    CV: RRR.  +S1+S2.     Abd: Soft/NT/ND.  +BS. +BM this AM.    Extremities: No edema.  +pp.    Incision(s): MSI with dressing c/d/i.  Sternum stable.                       PAST MEDICAL & SURGICAL HISTORY:  Hypothyroidism  Nonrheumatic mitral (valve) insufficiency  Nonrheumatic aortic valve insufficiency  Hyperlipidemia  Hypertension  Aortic aneurysm  S/P left knee surgery: Total knee replacement  S/P hernia repair: x2

## 2019-10-25 NOTE — PROGRESS NOTE ADULT - ASSESSMENT
74 year old female with PMH of HTN, HLD, hypothyroidism, moderate AI, and known stable aortic aneurysm with progressively worsening symptoms of fatigue, tiredness, intermittent cough and intermittent chest palpitation     Procedure: Elective Ascending aortic aneurysm repair with 32 mm decron graft & AVR with 23 mm villegas valve replacement (root sparing)    Postopertive course: initial coagulopathy and acute blood loss anemia requiring blood and product transfusion. PVC and bilateral pleural effusions on 10/24 with hypoxia overnight requiring right chest tube placement and IV diuretics. Right pigtail subsequently removed.  10/25 Left pigtail removed.  Colchicine started x 3 days.

## 2019-10-25 NOTE — DISCHARGE NOTE NURSING/CASE MANAGEMENT/SOCIAL WORK - PATIENT PORTAL LINK FT
You can access the FollowMyHealth Patient Portal offered by [facility] by registering at the following website: [email or web address]. By joining [facility]’s FollowMyHealth portal, you will also be able to view your health information using other applications (apps) compatible with our system.

## 2019-10-26 ENCOUNTER — TRANSCRIPTION ENCOUNTER (OUTPATIENT)
Age: 74
End: 2019-10-26

## 2019-10-26 VITALS
OXYGEN SATURATION: 100 % | SYSTOLIC BLOOD PRESSURE: 135 MMHG | TEMPERATURE: 98 F | DIASTOLIC BLOOD PRESSURE: 80 MMHG | HEART RATE: 71 BPM | RESPIRATION RATE: 16 BRPM

## 2019-10-26 DIAGNOSIS — I44.7 LEFT BUNDLE-BRANCH BLOCK, UNSPECIFIED: ICD-10-CM

## 2019-10-26 LAB
ALBUMIN SERPL ELPH-MCNC: 2.8 G/DL — LOW (ref 3.3–5.2)
ALP SERPL-CCNC: 76 U/L — SIGNIFICANT CHANGE UP (ref 40–120)
ALT FLD-CCNC: 27 U/L — SIGNIFICANT CHANGE UP
ANION GAP SERPL CALC-SCNC: 11 MMOL/L — SIGNIFICANT CHANGE UP (ref 5–17)
AST SERPL-CCNC: 37 U/L — HIGH
BILIRUB SERPL-MCNC: 1.3 MG/DL — SIGNIFICANT CHANGE UP (ref 0.4–2)
BUN SERPL-MCNC: 23 MG/DL — HIGH (ref 8–20)
CALCIUM SERPL-MCNC: 8.4 MG/DL — LOW (ref 8.6–10.2)
CHLORIDE SERPL-SCNC: 99 MMOL/L — SIGNIFICANT CHANGE UP (ref 98–107)
CO2 SERPL-SCNC: 29 MMOL/L — SIGNIFICANT CHANGE UP (ref 22–29)
CREAT SERPL-MCNC: 0.53 MG/DL — SIGNIFICANT CHANGE UP (ref 0.5–1.3)
GLUCOSE SERPL-MCNC: 102 MG/DL — SIGNIFICANT CHANGE UP (ref 70–115)
HCT VFR BLD CALC: 33.3 % — LOW (ref 34.5–45)
HGB BLD-MCNC: 11.3 G/DL — LOW (ref 11.5–15.5)
MAGNESIUM SERPL-MCNC: 1.8 MG/DL — SIGNIFICANT CHANGE UP (ref 1.6–2.6)
MCHC RBC-ENTMCNC: 31.6 PG — SIGNIFICANT CHANGE UP (ref 27–34)
MCHC RBC-ENTMCNC: 33.9 GM/DL — SIGNIFICANT CHANGE UP (ref 32–36)
MCV RBC AUTO: 93 FL — SIGNIFICANT CHANGE UP (ref 80–100)
PHOSPHATE SERPL-MCNC: 2 MG/DL — LOW (ref 2.4–4.7)
PLATELET # BLD AUTO: 140 K/UL — LOW (ref 150–400)
POTASSIUM SERPL-MCNC: 3.7 MMOL/L — SIGNIFICANT CHANGE UP (ref 3.5–5.3)
POTASSIUM SERPL-SCNC: 3.7 MMOL/L — SIGNIFICANT CHANGE UP (ref 3.5–5.3)
PROT SERPL-MCNC: 5.4 G/DL — LOW (ref 6.6–8.7)
RBC # BLD: 3.58 M/UL — LOW (ref 3.8–5.2)
RBC # FLD: 12.9 % — SIGNIFICANT CHANGE UP (ref 10.3–14.5)
SODIUM SERPL-SCNC: 139 MMOL/L — SIGNIFICANT CHANGE UP (ref 135–145)
WBC # BLD: 9.13 K/UL — SIGNIFICANT CHANGE UP (ref 3.8–10.5)
WBC # FLD AUTO: 9.13 K/UL — SIGNIFICANT CHANGE UP (ref 3.8–10.5)

## 2019-10-26 PROCEDURE — 71045 X-RAY EXAM CHEST 1 VIEW: CPT | Mod: 26

## 2019-10-26 RX ORDER — AMLODIPINE BESYLATE 2.5 MG/1
1 TABLET ORAL
Qty: 30 | Refills: 1
Start: 2019-10-26 | End: 2019-12-24

## 2019-10-26 RX ORDER — MAGNESIUM OXIDE 400 MG ORAL TABLET 241.3 MG
400 TABLET ORAL
Refills: 0 | Status: DISCONTINUED | OUTPATIENT
Start: 2019-10-26 | End: 2019-10-26

## 2019-10-26 RX ORDER — SIMVASTATIN 20 MG/1
1 TABLET, FILM COATED ORAL
Qty: 0 | Refills: 0 | DISCHARGE

## 2019-10-26 RX ORDER — POTASSIUM CHLORIDE 20 MEQ
20 PACKET (EA) ORAL DAILY
Refills: 0 | Status: DISCONTINUED | OUTPATIENT
Start: 2019-10-26 | End: 2019-10-26

## 2019-10-26 RX ORDER — LEVOTHYROXINE SODIUM 125 MCG
1 TABLET ORAL
Qty: 0 | Refills: 0 | DISCHARGE

## 2019-10-26 RX ORDER — ACETAMINOPHEN 500 MG
2 TABLET ORAL
Qty: 40 | Refills: 0
Start: 2019-10-26 | End: 2019-10-30

## 2019-10-26 RX ORDER — POTASSIUM CHLORIDE 20 MEQ
1 PACKET (EA) ORAL
Qty: 14 | Refills: 0
Start: 2019-10-26 | End: 2019-11-08

## 2019-10-26 RX ORDER — ESCITALOPRAM OXALATE 10 MG/1
1 TABLET, FILM COATED ORAL
Qty: 30 | Refills: 1
Start: 2019-10-26 | End: 2019-12-24

## 2019-10-26 RX ORDER — FUROSEMIDE 40 MG
1 TABLET ORAL
Qty: 14 | Refills: 0
Start: 2019-10-26 | End: 2019-11-08

## 2019-10-26 RX ORDER — METOPROLOL TARTRATE 50 MG
1 TABLET ORAL
Qty: 60 | Refills: 1
Start: 2019-10-26 | End: 2019-12-24

## 2019-10-26 RX ORDER — RANITIDINE HYDROCHLORIDE 150 MG/1
1 TABLET, FILM COATED ORAL
Qty: 0 | Refills: 0 | DISCHARGE

## 2019-10-26 RX ORDER — AMLODIPINE BESYLATE 2.5 MG/1
5 TABLET ORAL DAILY
Refills: 0 | Status: DISCONTINUED | OUTPATIENT
Start: 2019-10-26 | End: 2019-10-26

## 2019-10-26 RX ORDER — ASPIRIN/CALCIUM CARB/MAGNESIUM 324 MG
1 TABLET ORAL
Qty: 30 | Refills: 1
Start: 2019-10-26 | End: 2019-12-24

## 2019-10-26 RX ORDER — AMLODIPINE BESYLATE 2.5 MG/1
2 TABLET ORAL
Qty: 0 | Refills: 0 | DISCHARGE

## 2019-10-26 RX ORDER — ESCITALOPRAM OXALATE 10 MG/1
1 TABLET, FILM COATED ORAL
Qty: 0 | Refills: 0 | DISCHARGE

## 2019-10-26 RX ORDER — LEVOTHYROXINE SODIUM 125 MCG
1 TABLET ORAL
Qty: 30 | Refills: 1
Start: 2019-10-26 | End: 2019-12-24

## 2019-10-26 RX ORDER — AMLODIPINE BESYLATE 2.5 MG/1
2 TABLET ORAL
Qty: 60 | Refills: 1
Start: 2019-10-26 | End: 2019-12-24

## 2019-10-26 RX ORDER — ASPIRIN/CALCIUM CARB/MAGNESIUM 324 MG
1 TABLET ORAL
Qty: 0 | Refills: 0 | DISCHARGE

## 2019-10-26 RX ORDER — SIMVASTATIN 20 MG/1
1 TABLET, FILM COATED ORAL
Qty: 30 | Refills: 1
Start: 2019-10-26 | End: 2019-12-24

## 2019-10-26 RX ORDER — POTASSIUM CHLORIDE 20 MEQ
40 PACKET (EA) ORAL ONCE
Refills: 0 | Status: COMPLETED | OUTPATIENT
Start: 2019-10-26 | End: 2019-10-26

## 2019-10-26 RX ADMIN — PANTOPRAZOLE SODIUM 40 MILLIGRAM(S): 20 TABLET, DELAYED RELEASE ORAL at 11:23

## 2019-10-26 RX ADMIN — ESCITALOPRAM OXALATE 10 MILLIGRAM(S): 10 TABLET, FILM COATED ORAL at 11:22

## 2019-10-26 RX ADMIN — Medication 20 MILLIGRAM(S): at 05:07

## 2019-10-26 RX ADMIN — Medication 40 MILLIEQUIVALENT(S): at 11:22

## 2019-10-26 RX ADMIN — SODIUM CHLORIDE 3 MILLILITER(S): 9 INJECTION INTRAMUSCULAR; INTRAVENOUS; SUBCUTANEOUS at 05:03

## 2019-10-26 RX ADMIN — MAGNESIUM OXIDE 400 MG ORAL TABLET 400 MILLIGRAM(S): 241.3 TABLET ORAL at 11:24

## 2019-10-26 RX ADMIN — Medication 25 MILLIGRAM(S): at 05:06

## 2019-10-26 RX ADMIN — Medication 325 MILLIGRAM(S): at 11:23

## 2019-10-26 RX ADMIN — Medication 75 MICROGRAM(S): at 05:07

## 2019-10-26 RX ADMIN — Medication 0.6 MILLIGRAM(S): at 05:07

## 2019-10-26 RX ADMIN — Medication 20 MILLIEQUIVALENT(S): at 11:22

## 2019-10-26 RX ADMIN — AMLODIPINE BESYLATE 5 MILLIGRAM(S): 2.5 TABLET ORAL at 11:22

## 2019-10-26 NOTE — PROGRESS NOTE ADULT - PROBLEM SELECTOR PLAN 2
postop  Early cardiology followup this week for EKG/Holter
colace, miralax, protonix  GI ppx/prophylaxis  ASA, mechanical scds
As above

## 2019-10-26 NOTE — PROGRESS NOTE ADULT - PROBLEM SELECTOR PROBLEM 1
Aortic aneurysm without rupture, unspecified portion of aorta

## 2019-10-26 NOTE — DISCHARGE NOTE PROVIDER - NSDCFUADDINST_GEN_ALL_CORE_FT
Please call the Cardiothoracic Surgery office at [phone] if you are experiencing any shortness of breath, chest pain, fevers or chills, drainage from the incisions, persistent nausea, vomiting or if you have any questions about your medications. If the symptoms are severe, call 911 and go to the nearest hospital. You can also call [phone] for an emergency [facility] ambulance, which will take you to the closest [facility].    If you need any assistance for making any appointments for a new consult or referral in any specialty, please call our [facility] Clinical Coordination Center at [phone].

## 2019-10-26 NOTE — PROGRESS NOTE ADULT - ASSESSMENT
74 year old female with PMH of HTN, HLD, hypothyroidism, moderate AI, and known stable aortic aneurysm with progressively worsening symptoms of fatigue, tiredness, intermittent cough and intermittent chest palpitation     Procedure: Elective Ascending aortic aneurysm repair with 32 mm decron graft & AVR with 23 mm villegas valve replacement (root sparing)    Postopertive course: New LBBB noted, tolerating BB. initial coagulopathy and acute blood loss anemia requiring blood and product transfusion. PVC and bilateral pleural effusions on 10/24 with hypoxia overnight requiring right chest tube placement and IV diuretics. Right pigtail subsequently removed.  10/25 Left pigtail removed.  Colchicine started x 3 days.  Hypertensive: Norvasc Started.

## 2019-10-26 NOTE — DISCHARGE NOTE PROVIDER - HOSPITAL COURSE
74 year old female with PMH of HTN, HLD, hypothyroidism, moderate AI, and known stable aortic aneurysm with progressively worsening symptoms of fatigue, tiredness, intermittent cough and intermittent chest palpitation         Procedure: Elective Ascending aortic aneurysm repair with 32 mm decron graft & AVR with 23 mm villegas valve replacement (root sparing)        Postopertive course: New LBBB noted, tolerating BB. initial coagulopathy and acute blood loss anemia requiring blood and product transfusion. PVC and bilateral pleural effusions on 10/24 with hypoxia overnight requiring right chest tube placement and IV diuretics. Right pigtail subsequently removed.  10/25 Left pigtail removed.  Colchicine started x 3 days.  Hypertensive: Norvasc Started. Dr Herrera aware of new LBBB and approves discharge on beta blockade.

## 2019-10-26 NOTE — DISCHARGE NOTE PROVIDER - NSDCCPCAREPLAN_GEN_ALL_CORE_FT
PRINCIPAL DISCHARGE DIAGNOSIS  Diagnosis: Ascending aortic aneurysm  Assessment and Plan of Treatment: 1. Take ALL of your medications as ordered. Fill your prescriptions the day you are discharged and take according to the schedule you were given. Continue to take a stool softener if you are taking narcotic pain medications. If you have any questions or are unable to fill the prescriptions, please call the office right away at [phone].  2. Shower daily. Clean all incisions daily while showering with warm water and mild soap, pat dry with a clean towel and do not cover with any dressings unless instructed to. No bathing, swimming in a pool or the ocean until instructed by MD.  DO NOT use creams or lotions on the wound.  3. We advise that you do not drive until instructed by MD. Sit in the rear passenger seat with the red pillow between chest and seatbelt to avoid injury in the event of a motor vehicle accident until you see the surgeon again in the office.  4. You may not return to work until instructed by MD.   5. Please eat a low fat, low cholesterol, low salt diet. (No added/extra salt). A nutritional supplement like Ensure or Glucerna (for diabetics) may help you reach your nutritional goals after surgery and aid in your recovery.  6. Weigh yourself every day in the morning and record it in the weight log in your red folder. Notify the office of any weight gain more than 2-3 pounds in 24 hours.  **Please LIMIT your fluid intake to less than a liter a day.**  7. Continue breathing exercises several times a day. Continue to use your heart pillow when coughing.  8. No heavy lifting nothing greater than 5 pounds until cleared by MD. We recommend that you sleep on your back and not your side or stomach for the next 4-6 weeks.  9. Call / Notify MD any fever greater than 101.0, any drainage from incisions or if they become red, hot or very tender to the touch.  10. Increase activity as tolerated. Walk indoors and/or outdoors at least 3 times a day.  11. Keep surgical or sports bra on 24 hours a day for at least 2 weeks.      SECONDARY DISCHARGE DIAGNOSES  Diagnosis: Hypertension, unspecified type  Assessment and Plan of Treatment: Take all medications as prescribed.    Diagnosis: Bilateral pleural effusion  Assessment and Plan of Treatment: Take all medications as prescribed.  Remove left side dressing tonight.    Diagnosis: Acute blood loss anemia  Assessment and Plan of Treatment: By taking iron (ferrous sulfate), folate and vitamin C as directed for one month, your red blood cell count should improve. Please take a stool softener with the medications as they may cause constipation.    Diagnosis: Left bundle branch block  Assessment and Plan of Treatment: You have new EKG findings. We do not feel there is any immediate danger as we have monitored you and there have been no issues on the monitor. However, we do recommend that you follow up with your cardiologist within one week to monitor these EKG changes.

## 2019-10-26 NOTE — DISCHARGE NOTE PROVIDER - CARE PROVIDER_API CALL
Deng Herrera)  Surgery; Thoracic and Cardiac Surgery  [address] [place] [address]  Phone: [phone]  Fax: [phone]  Follow Up Time: 2 weeks    Farrah Melendez)  Cardiology  [address] [place] [address]  Phone: [phone]  Fax: [phone]  Follow Up Time: 1 week

## 2019-10-26 NOTE — PROGRESS NOTE ADULT - PROBLEM SELECTOR PROBLEM 2
Left bundle branch block
Need for prophylactic measure
Hypertension, unspecified type

## 2019-10-26 NOTE — PROGRESS NOTE ADULT - PROBLEM SELECTOR PLAN 3
Continue statin
Norvasc ordered today
on statin at home, will restart
Continue statin
Continue statin

## 2019-10-26 NOTE — DISCHARGE NOTE PROVIDER - PROVIDER TOKENS
PROVIDER:[TOKEN:[[id number]],FOLLOWUP:[2 weeks]],PROVIDER:[TOKEN:[[id number]],FOLLOWUP:[1 week]]

## 2019-10-26 NOTE — DISCHARGE NOTE PROVIDER - NSDCCPTREATMENT_GEN_ALL_CORE_FT
PRINCIPAL PROCEDURE  Procedure: Ascending aortic aneurysm repair  Findings and Treatment: with 32 mm decron graft  23 mm villegas valve replacement   native coronary arteries and root remain- root apraing procedure

## 2019-10-26 NOTE — PROGRESS NOTE ADULT - PROBLEM SELECTOR PLAN 7
Lovenox and SCDs for DVT prophylaxis  Protonix for GI prophylaxis   Bowel regimen held due to diarrhea this AM and initiation of colchicine per Dr. Herrera.    Discussed with Dr. Herrera in AM rounds.
Lovenox and SCDs for DVT prophylaxis  Protonix for GI prophylaxis   continue with bowel regimen
Bilateral pigtail catheters placed and removed.

## 2019-10-26 NOTE — DISCHARGE NOTE PROVIDER - CARE PROVIDERS DIRECT ADDRESSES
[email or web address]

## 2019-10-26 NOTE — DISCHARGE NOTE PROVIDER - NSDCFUADDAPPT_GEN_ALL_CORE_FT
Please call the office on Monday for an appointment with the surgeon in two weeks at the cardiac surgery office at [facility], 2nd floor.   Your Care Navigator Nurse Practitioner will be in touch to see you in your home within a few days from discharge. The [facility] Your [facility] program can help ensure you understand your medications, discharge instructions and answer any questions you may have at that time. They are also a great source to address concerns during the day and may be reached at [phone].

## 2019-10-26 NOTE — PROGRESS NOTE ADULT - PROBLEM SELECTOR PLAN 8
Lovenox and SCDs for DVT prophylaxis  Protonix for GI prophylaxis   Bowel regimen held due to diarrhea this AM and initiation of colchicine per Dr. Herrera.    Discharge home today  Discussed with Dr. Herrera in AM rounds.

## 2019-10-26 NOTE — PROGRESS NOTE ADULT - PROBLEM SELECTOR PLAN 4
resolved s/p cryo infusion
resolved s/p cryo infusion
Continue statin
cardene
Lovenox and SCDs for DVT prophylaxis  Protonix for GI prophylaxis  continue with bowel regimen

## 2019-10-26 NOTE — PROGRESS NOTE ADULT - SUBJECTIVE AND OBJECTIVE BOX
Patient discussed on morning rounds with Dr. Herrera    Operation: 10/21 AVR/ Ascending aortic aneurysm repair      Surgeon: Javier    Referring Physician: Al    SUBJECTIVE ASSESSMENT: "I feel ok" NAD denies cp, sob    Vital Signs Last 24 Hrs  T(C): 36.8 (26 Oct 2019 09:40), Max: 37.1 (26 Oct 2019 05:04)  T(F): 98.3 (26 Oct 2019 09:40), Max: 98.7 (26 Oct 2019 05:04)  HR: 76 (26 Oct 2019 11:14) (67 - 79)  BP: 180/76 (26 Oct 2019 11:14) (135/87 - 180/76)  BP(mean): --  RR: 19 (26 Oct 2019 09:40) (16 - 19)  SpO2: 100% (26 Oct 2019 09:40) (94% - 100%) on RA    Tele/Alarms: SR 72 BBB  LVEF: 40    I&O's Detail    25 Oct 2019 07:01  -  26 Oct 2019 07:00  --------------------------------------------------------  IN:    Oral Fluid: 360 mL  Total IN: 360 mL    OUT:    Voided: 200 mL  Total OUT: 200 mL    Total NET: 160 mL      26 Oct 2019 07:01  -  26 Oct 2019 12:36  --------------------------------------------------------  IN:  Total IN: 0 mL    OUT:    Voided: 500 mL  Total OUT: 500 mL    Total NET: -500 mL          PHYSICAL EXAM:  General: WN/WD NAD  Neurology: A&Ox3, nonfocal, DELA CRUZ x 4  Respiratory: CTA B/L  CV: RRR, S1S2, no murmurs, rubs or gallops  Abdominal: Soft, NT, ND +BS, Last BM today  Extremities: No edema, + peripheral pulses  Incisions: MSI Healing Well, Sternum Stable staples removed. b/l posterior dressings. Right removed.     LABS:                        11.3   9.13  )-----------( 140      ( 26 Oct 2019 06:00 )             33.3       10-26    139  |  99  |  23.0<H>  ----------------------------<  102  3.7   |  29.0  |  0.53    Ca    8.4<L>      26 Oct 2019 06:00  Phos  2.0     10-26  Mg     1.8     10-26    TPro  5.4<L>  /  Alb  2.8<L>  /  TBili  1.3  /  DBili  x   /  AST  37<H>  /  ALT  27  /  AlkPhos  76  10-26          COUMADIN:   No.        DOSE:                  INDICATION:                GOAL INR:                       Physician following/managing    ALLERGIES: codeine (Vomiting)  No Known Allergies    MEDICATIONS  (STANDING):  amLODIPine   Tablet 5 milliGRAM(s) Oral daily  aspirin enteric coated 325 milliGRAM(s) Oral daily  colchicine 0.6 milliGRAM(s) Oral two times a day  enoxaparin Injectable 40 milliGRAM(s) SubCutaneous daily  escitalopram 10 milliGRAM(s) Oral daily  furosemide    Tablet 20 milliGRAM(s) Oral daily  levothyroxine 75 MICROGram(s) Oral daily  magnesium oxide 400 milliGRAM(s) Oral three times a day with meals  metoprolol tartrate 25 milliGRAM(s) Oral every 12 hours  pantoprazole    Tablet 40 milliGRAM(s) Oral daily  potassium chloride    Tablet ER 20 milliEquivalent(s) Oral daily  simvastatin 20 milliGRAM(s) Oral at bedtime  sodium chloride 0.9% lock flush 3 milliLiter(s) IV Push every 8 hours      Discharge CXR: < from: Xray Chest 1 View- PORTABLE-Routine (10.26.19 @ 07:34) >    IMPRESSION: Small bilateral pleural effusions noted without significant   change from priorstudy dated 10/25/2019. Status post valve replacement.    < end of copied text >      Discharge EKG: < from: 12 Lead ECG (10.22.19 @ 01:35) >    Diagnosis Line Normal sinus rhythm  Left bundle branch block  Abnormal ECG    < end of copied text >      PAST MEDICAL & SURGICAL HISTORY:  Hypothyroidism  Nonrheumatic mitral (valve) insufficiency  Nonrheumatic aortic valve insufficiency  Hyperlipidemia  Hypertension  Aortic aneurysm  S/P left knee surgery: Total knee replacement  S/P hernia repair: x2

## 2019-10-26 NOTE — PROGRESS NOTE ADULT - PROBLEM SELECTOR PLAN 6
S/P bilateral pigtail placement yesterday.  Right pigtail removed yesterday.  Remove left pigtail today.  AM xray.
s/p right pleural chest tube  Diuretics on board but may need left chest tube.
resolved s/p PRBC transfusion

## 2019-10-26 NOTE — DISCHARGE NOTE PROVIDER - NSDCHHNEEDSERVICE_GEN_ALL_CORE
Observation and assessment/Wound care and assessment/Teaching and training/Medication teaching and assessment

## 2019-10-26 NOTE — PROGRESS NOTE ADULT - PROBLEM SELECTOR PROBLEM 4
Coagulopathy
Hyperlipidemia, unspecified hyperlipidemia type
Hypertension, unspecified type
Need for prophylactic measure
Coagulopathy

## 2019-10-26 NOTE — PROGRESS NOTE ADULT - PROBLEM SELECTOR PLAN 1
s/p AVR/Asc aortic repair  continue aspirin, Zocor, and Lopressor.  Encourage coughing and deep breathing exercises/incentive spirometry.  Continue with PT, increase activity as tolerated  Started Colchicine x 3 days per Dr. Herrera.
strict blood pressure goal sbp< 120   remains on cardene at this time   will add  beta blocker when able
neurologically intact  hemodynamically stable  continue aspirin for post valve ppx  continue statin  will discuss with Dr. Herrera about adding beta blocker with new postop bundle branch block  oxygenating well, coughing and deep breathing exercises/incentive spirometry encouraged  d/c intro, schwartz and CT this morning  continue with PT, increase activity as tolerated  FS AC+HS with coverage for glycemic control  Pain control PRN  stable for transfer to floor  above plan of care to be discussed with CT surgical team on am rounds
s/p AVR/Asc aortic repair  continue aspirin, Zocor, and Lopressor.  Encourage coughing and deep breathing exercises/incentive spirometry.  Continue with PT, increase activity as tolerated  Removed left pigtail today per Dr. Herrera.  Start Colchicine x 3 days per Dr. Herrera.
s/p AVR/Asc aortic repair  continue aspirin for AVR  continue statin  Tolerating Lopressor 25 mg BID but one episode lower BP, will repeat and monitor  oxygenating well, coughing and deep breathing exercises/incentive spirometry encouraged  continue with PT, increase activity as tolerated  FS AC+HS with coverage for glycemic control  Pain control PRN  stable for transfer to floor  Possible discharge home Saturday.  D/W Dr Herrera in AM rounds.

## 2019-10-26 NOTE — PROGRESS NOTE ADULT - PROBLEM SELECTOR PROBLEM 3
Hypertension, unspecified type
Hyperlipidemia, unspecified hyperlipidemia type

## 2019-10-29 ENCOUNTER — APPOINTMENT (OUTPATIENT)
Dept: CARE COORDINATION | Facility: HOME HEALTH | Age: 74
End: 2019-10-29
Payer: MEDICARE

## 2019-10-29 VITALS
OXYGEN SATURATION: 94 % | WEIGHT: 116 LBS | BODY MASS INDEX: 21.22 KG/M2 | SYSTOLIC BLOOD PRESSURE: 138 MMHG | DIASTOLIC BLOOD PRESSURE: 80 MMHG | RESPIRATION RATE: 16 BRPM | HEART RATE: 81 BPM

## 2019-10-29 PROCEDURE — 99024 POSTOP FOLLOW-UP VISIT: CPT

## 2019-10-29 RX ORDER — HYDROCHLOROTHIAZIDE 25 MG/1
25 TABLET ORAL
Qty: 90 | Refills: 1 | Status: DISCONTINUED | COMMUNITY
Start: 2018-08-20 | End: 2019-10-29

## 2019-10-29 RX ORDER — ASPIRIN 81 MG
81 TABLET, DELAYED RELEASE (ENTERIC COATED) ORAL
Refills: 0 | Status: DISCONTINUED | COMMUNITY
End: 2019-10-29

## 2019-10-29 NOTE — HISTORY OF PRESENT ILLNESS
[FreeTextEntry1] : This is a 73 yo female s/p Ascending Aortic Replacement and AVR with Dr. Herrera on 10/21.  Pt reports that she is doing well, and has no complaints.  Medications reviewed and education provided.  Denies fever, SOB, pain, dizziness, palpitations.  Daughter and patient do report that she is having problems concentrating.

## 2019-10-29 NOTE — ASSESSMENT
[FreeTextEntry1] : This is a 73 yo female s/p  Ascending aortic replacement and AVR with Dr. Hrerera on 10/21.  Reports issues with "focusing with reading/texting"  No neuro deficits noted.  Advised that this is common and to report any worseining symptoms.\par Post Operative Care:\par Pt advised of importance of daily weights. Pt instructed on how to use incentive spirometer every hour, demonstrated proper use. Also advised to cleanse incisions daily with mild soap and water and to avoid lotions, powders, ointments or creams near or on the incision. Low salt, low fat diet encouraged and discussed. Pt advised to avoid heavy lifting or straining. \par \par Follow Your [facility] team will continue to follow up with pt status. \par NP/CCC roles explained with pt understanding, contact information provided. Pt agrees to call with any questions, issues or concerns.  Worsening symptoms reviewed with patient understanding.  \par \par FOLLOW UP APPOINTMENTS:\par CTS: Dr. herrera\par CARDIOLOGIST: Dr. Gonsalez\par \par

## 2019-11-01 ENCOUNTER — APPOINTMENT (OUTPATIENT)
Dept: CARDIOLOGY | Facility: CLINIC | Age: 74
End: 2019-11-01
Payer: MEDICARE

## 2019-11-01 VITALS
HEART RATE: 60 BPM | HEIGHT: 62 IN | SYSTOLIC BLOOD PRESSURE: 110 MMHG | WEIGHT: 112 LBS | OXYGEN SATURATION: 100 % | BODY MASS INDEX: 20.61 KG/M2 | DIASTOLIC BLOOD PRESSURE: 50 MMHG

## 2019-11-01 PROCEDURE — 99214 OFFICE O/P EST MOD 30 MIN: CPT

## 2019-11-01 NOTE — HISTORY OF PRESENT ILLNESS
[FreeTextEntry1] : This is a 73 yo female s/p Ascending Aortic Replacement and AVR with Dr. Herrera on 10/21.  Pt reports that she is doing well, and has no complaints.  Medications reviewed and education provided.  Denies fever, SOB, pain, dizziness, palpitations.  Patient do report that she is having problems concentrating.

## 2019-11-01 NOTE — ASSESSMENT
[FreeTextEntry1] : This is a 73 yo female s/p  Ascending aortic replacement and AVR with Dr. Herrera on 10/21.  Reports issues with "focusing with reading/texting"  No neuro deficits noted. Symptoms are getting better. Her concentration is much better. She is physically active. Her scar is healed. She denies any chest pains or shortness of breath. She is well controlled. We will refer to cardiac rehabilitation. We will do exercise tolerance test and echocardiogram prior to rehabilitation. She will continue with Lasix for another week. We will recheck blood pressure in 2 weeks.\par \par \par  06:07

## 2019-11-05 ENCOUNTER — APPOINTMENT (OUTPATIENT)
Dept: CARDIOTHORACIC SURGERY | Facility: CLINIC | Age: 74
End: 2019-11-05

## 2019-11-05 VITALS
OXYGEN SATURATION: 99 % | HEIGHT: 59 IN | DIASTOLIC BLOOD PRESSURE: 64 MMHG | SYSTOLIC BLOOD PRESSURE: 114 MMHG | WEIGHT: 110 LBS | TEMPERATURE: 98.1 F | HEART RATE: 57 BPM | RESPIRATION RATE: 15 BRPM | BODY MASS INDEX: 22.18 KG/M2

## 2019-11-05 RX ORDER — POTASSIUM CHLORIDE 1500 MG/1
20 TABLET, EXTENDED RELEASE ORAL DAILY
Refills: 0 | Status: COMPLETED | COMMUNITY
End: 2019-11-05

## 2019-11-05 RX ORDER — FUROSEMIDE 20 MG/1
20 TABLET ORAL DAILY
Refills: 0 | Status: COMPLETED | COMMUNITY
End: 2019-11-05

## 2019-11-05 RX ORDER — ACETAMINOPHEN 500 MG/1
500 TABLET, COATED ORAL
Refills: 0 | Status: ACTIVE | COMMUNITY

## 2019-11-12 ENCOUNTER — MEDICATION RENEWAL (OUTPATIENT)
Age: 74
End: 2019-11-12

## 2019-11-12 PROCEDURE — 85014 HEMATOCRIT: CPT

## 2019-11-12 PROCEDURE — 93312 ECHO TRANSESOPHAGEAL: CPT

## 2019-11-12 PROCEDURE — 85027 COMPLETE CBC AUTOMATED: CPT

## 2019-11-12 PROCEDURE — P9059: CPT

## 2019-11-12 PROCEDURE — 85610 PROTHROMBIN TIME: CPT

## 2019-11-12 PROCEDURE — 36430 TRANSFUSION BLD/BLD COMPNT: CPT

## 2019-11-12 PROCEDURE — 82553 CREATINE MB FRACTION: CPT

## 2019-11-12 PROCEDURE — 97163 PT EVAL HIGH COMPLEX 45 MIN: CPT

## 2019-11-12 PROCEDURE — P9045: CPT

## 2019-11-12 PROCEDURE — 82550 ASSAY OF CK (CPK): CPT

## 2019-11-12 PROCEDURE — 86965 POOLING BLOOD PLATELETS: CPT

## 2019-11-12 PROCEDURE — 82962 GLUCOSE BLOOD TEST: CPT

## 2019-11-12 PROCEDURE — 84132 ASSAY OF SERUM POTASSIUM: CPT

## 2019-11-12 PROCEDURE — 83735 ASSAY OF MAGNESIUM: CPT

## 2019-11-12 PROCEDURE — 82803 BLOOD GASES ANY COMBINATION: CPT

## 2019-11-12 PROCEDURE — 97530 THERAPEUTIC ACTIVITIES: CPT

## 2019-11-12 PROCEDURE — 88311 DECALCIFY TISSUE: CPT

## 2019-11-12 PROCEDURE — P9012: CPT

## 2019-11-12 PROCEDURE — 82330 ASSAY OF CALCIUM: CPT

## 2019-11-12 PROCEDURE — 71045 X-RAY EXAM CHEST 1 VIEW: CPT

## 2019-11-12 PROCEDURE — 93320 DOPPLER ECHO COMPLETE: CPT

## 2019-11-12 PROCEDURE — P9037: CPT

## 2019-11-12 PROCEDURE — C1889: CPT

## 2019-11-12 PROCEDURE — 83605 ASSAY OF LACTIC ACID: CPT

## 2019-11-12 PROCEDURE — 82435 ASSAY OF BLOOD CHLORIDE: CPT

## 2019-11-12 PROCEDURE — 94002 VENT MGMT INPAT INIT DAY: CPT

## 2019-11-12 PROCEDURE — P9016: CPT

## 2019-11-12 PROCEDURE — C1729: CPT

## 2019-11-12 PROCEDURE — 84484 ASSAY OF TROPONIN QUANT: CPT

## 2019-11-12 PROCEDURE — 80048 BASIC METABOLIC PNL TOTAL CA: CPT

## 2019-11-12 PROCEDURE — 93005 ELECTROCARDIOGRAM TRACING: CPT

## 2019-11-12 PROCEDURE — 80053 COMPREHEN METABOLIC PANEL: CPT

## 2019-11-12 PROCEDURE — 97110 THERAPEUTIC EXERCISES: CPT

## 2019-11-12 PROCEDURE — C1768: CPT

## 2019-11-12 PROCEDURE — 36415 COLL VENOUS BLD VENIPUNCTURE: CPT

## 2019-11-12 PROCEDURE — 82947 ASSAY GLUCOSE BLOOD QUANT: CPT

## 2019-11-12 PROCEDURE — 97116 GAIT TRAINING THERAPY: CPT

## 2019-11-12 PROCEDURE — 88305 TISSUE EXAM BY PATHOLOGIST: CPT

## 2019-11-12 PROCEDURE — 93325 DOPPLER ECHO COLOR FLOW MAPG: CPT

## 2019-11-12 PROCEDURE — 85730 THROMBOPLASTIN TIME PARTIAL: CPT

## 2019-11-12 PROCEDURE — 84295 ASSAY OF SERUM SODIUM: CPT

## 2019-11-12 PROCEDURE — 84100 ASSAY OF PHOSPHORUS: CPT

## 2019-11-14 ENCOUNTER — APPOINTMENT (OUTPATIENT)
Dept: CARDIOLOGY | Facility: CLINIC | Age: 74
End: 2019-11-14
Payer: MEDICARE

## 2019-11-14 PROCEDURE — 93306 TTE W/DOPPLER COMPLETE: CPT

## 2019-11-14 PROCEDURE — 93015 CV STRESS TEST SUPVJ I&R: CPT

## 2019-11-21 ENCOUNTER — APPOINTMENT (OUTPATIENT)
Dept: CARDIOLOGY | Facility: CLINIC | Age: 74
End: 2019-11-21
Payer: MEDICARE

## 2019-11-21 VITALS
HEART RATE: 56 BPM | HEIGHT: 59 IN | WEIGHT: 112 LBS | DIASTOLIC BLOOD PRESSURE: 70 MMHG | BODY MASS INDEX: 22.58 KG/M2 | OXYGEN SATURATION: 98 % | SYSTOLIC BLOOD PRESSURE: 146 MMHG

## 2019-11-21 PROCEDURE — 99215 OFFICE O/P EST HI 40 MIN: CPT

## 2019-11-21 NOTE — HISTORY OF PRESENT ILLNESS
[FreeTextEntry1] : This is a 75 yo female s/p Ascending Aortic Replacement and AVR with Dr. Herrera on 10/21.  Pt reports that she is doing well, and has no complaints.  Medications reviewed and education provided.  Denies fever, SOB, pain, dizziness, palpitations.  Patient do report that she is having problems concentrating.

## 2019-11-21 NOTE — PHYSICAL EXAM
[General Appearance - Well Developed] : well developed [Normal Appearance] : normal appearance [Well Groomed] : well groomed [General Appearance - Well Nourished] : well nourished [No Deformities] : no deformities [General Appearance - In No Acute Distress] : no acute distress [Normal Conjunctiva] : the conjunctiva exhibited no abnormalities [Eyelids - No Xanthelasma] : the eyelids demonstrated no xanthelasmas [Normal Jugular Venous A Waves Present] : normal jugular venous A waves present [Normal Jugular Venous V Waves Present] : normal jugular venous V waves present [No Jugular Venous Cronin A Waves] : no jugular venous cronin A waves [Respiration, Rhythm And Depth] : normal respiratory rhythm and effort [Exaggerated Use Of Accessory Muscles For Inspiration] : no accessory muscle use [Cyanosis, Localized] : no localized cyanosis [Petechial Hemorrhages (___cm)] : no petechial hemorrhages [Auscultation Breath Sounds / Voice Sounds] : lungs were clear to auscultation bilaterally [Heart Rate And Rhythm] : heart rate was normal and rhythm regular [Heart Sounds] : normal S1 and S2 [Heart Sounds Gallop] : no gallops [Murmurs] : no murmurs [Heart Sounds Pericardial Friction Rub] : no pericardial rub [Examination Of The Chest] : the chest was normal in appearance [Chest Visual Inspection Thoracic Asymmetry] : no chest asymmetry [Diminished Respiratory Excursion] : normal chest expansion [Bowel Sounds] : normal bowel sounds [Abdomen Soft] : soft [Abdomen Tenderness] : non-tender [Abdomen Mass (___ Cm)] : no abdominal mass palpated [Abnormal Walk] : normal gait [Nail Clubbing] : no clubbing  or cyanosis of the fingernails [Musculoskeletal - Swelling] : no joint swelling seen [Motor Tone] : muscle strength and tone were normal [Skin Color & Pigmentation] : normal skin color and pigmentation [Skin Turgor] : normal skin turgor [] : no rash [Deep Tendon Reflexes (DTR)] : deep tendon reflexes were 2+ and symmetric [Sensation] : the sensory exam was normal to light touch and pinprick [No Focal Deficits] : no focal deficits [Oriented To Time, Place, And Person] : oriented to person, place, and time [Impaired Insight] : insight and judgment were intact [Affect] : the affect was normal

## 2019-11-21 NOTE — ASSESSMENT
[FreeTextEntry1] : This is a 73 yo female s/p  Ascending aortic replacement and AVR with Dr. Herrera on 10/21.  Reports issues with "focusing with reading/texting"  No neuro deficits noted. Symptoms are getting better. Her concentration is much better. She is physically active. Her scar is healed. She denies any chest pains or shortness of breath. She is well controlled. We will refer to cardiac rehabilitation. Echocardiogram and exercise stress test reviewed. Patient will start cardiac rehabilitation. Patient is an aspirin 325 mg daily since her surgery. We will reduce the dose to 81 mg daily 3 months after surgery. Continue present medications cardiac followup 3 months on as needed.\par \par

## 2019-12-16 ENCOUNTER — APPOINTMENT (OUTPATIENT)
Dept: CARDIOLOGY | Facility: CLINIC | Age: 74
End: 2019-12-16
Payer: MEDICARE

## 2019-12-16 VITALS
DIASTOLIC BLOOD PRESSURE: 60 MMHG | HEIGHT: 62 IN | HEART RATE: 50 BPM | BODY MASS INDEX: 20.61 KG/M2 | OXYGEN SATURATION: 96 % | WEIGHT: 112 LBS | SYSTOLIC BLOOD PRESSURE: 120 MMHG

## 2019-12-16 PROCEDURE — 99214 OFFICE O/P EST MOD 30 MIN: CPT

## 2019-12-16 RX ORDER — ASPIRIN 325 MG/1
325 TABLET, FILM COATED ORAL DAILY
Refills: 0 | Status: DISCONTINUED | COMMUNITY
End: 2019-12-16

## 2019-12-16 NOTE — HISTORY OF PRESENT ILLNESS
[FreeTextEntry1] : This is a 75 yo female s/p Ascending Aortic Replacement and AVR with Dr. Herrera on 10/21.  Pt reports that she is doing well, and has no complaints.  Medications reviewed and education provided.  Denies fever, SOB, pain, dizziness, palpitations.  Patient do report that she is having problems concentrating.\par Patient had recent visit to emergency room with near syncope. She was dehydrated. Her pulse rate is running low between 45-50. She increase hydration. She's feeling better.

## 2019-12-16 NOTE — ASSESSMENT
[FreeTextEntry1] : This is a 73 yo female s/p  Ascending aortic replacement and AVR with Dr. Herrera on 10/21.  Reports issues with "focusing with reading/texting"  No neuro deficits noted. Symptoms are getting better. Her concentration is much better. She is physically active. Her scar is healed. She denies any chest pains or shortness of breath. She is well controlled. We will refer to cardiac rehabilitation. Echocardiogram and exercise stress test reviewed. Patient will start cardiac rehabilitation. Patient is an aspirin 325 mg daily since her surgery. We will reduce the dose to 81 mg daily 3 months after surgery. \par Near syncope secondary to dehydration. Continue vigorous hydration.\par Borderline hypotension and bradycardia. We'll reduce the dose of metoprolol to 12.5 mg twice a day. Blood pressure and heart rate check in 2 weeks. If it continues running  low we will discontinue amlodipine.

## 2019-12-16 NOTE — PHYSICAL EXAM
[General Appearance - Well Developed] : well developed [Normal Appearance] : normal appearance [Well Groomed] : well groomed [General Appearance - Well Nourished] : well nourished [No Deformities] : no deformities [General Appearance - In No Acute Distress] : no acute distress [Normal Conjunctiva] : the conjunctiva exhibited no abnormalities [Eyelids - No Xanthelasma] : the eyelids demonstrated no xanthelasmas [Normal Jugular Venous A Waves Present] : normal jugular venous A waves present [Normal Jugular Venous V Waves Present] : normal jugular venous V waves present [No Jugular Venous Cronin A Waves] : no jugular venous cronin A waves [Respiration, Rhythm And Depth] : normal respiratory rhythm and effort [Exaggerated Use Of Accessory Muscles For Inspiration] : no accessory muscle use [Cyanosis, Localized] : no localized cyanosis [Petechial Hemorrhages (___cm)] : no petechial hemorrhages [Auscultation Breath Sounds / Voice Sounds] : lungs were clear to auscultation bilaterally [Heart Rate And Rhythm] : heart rate was normal and rhythm regular [Heart Sounds] : normal S1 and S2 [Heart Sounds Gallop] : no gallops [Murmurs] : no murmurs [Heart Sounds Pericardial Friction Rub] : no pericardial rub [Examination Of The Chest] : the chest was normal in appearance [Chest Visual Inspection Thoracic Asymmetry] : no chest asymmetry [Diminished Respiratory Excursion] : normal chest expansion [Bowel Sounds] : normal bowel sounds [Abdomen Soft] : soft [Abdomen Tenderness] : non-tender [Abdomen Mass (___ Cm)] : no abdominal mass palpated [Abnormal Walk] : normal gait [Nail Clubbing] : no clubbing  or cyanosis of the fingernails [Musculoskeletal - Swelling] : no joint swelling seen [Motor Tone] : muscle strength and tone were normal [Skin Color & Pigmentation] : normal skin color and pigmentation [Skin Turgor] : normal skin turgor [] : no rash [Deep Tendon Reflexes (DTR)] : deep tendon reflexes were 2+ and symmetric [No Focal Deficits] : no focal deficits [Sensation] : the sensory exam was normal to light touch and pinprick [Oriented To Time, Place, And Person] : oriented to person, place, and time [Impaired Insight] : insight and judgment were intact [Affect] : the affect was normal

## 2020-01-02 ENCOUNTER — APPOINTMENT (OUTPATIENT)
Dept: CARDIOLOGY | Facility: CLINIC | Age: 75
End: 2020-01-02
Payer: MEDICARE

## 2020-01-02 VITALS
WEIGHT: 112 LBS | HEIGHT: 62 IN | HEART RATE: 48 BPM | SYSTOLIC BLOOD PRESSURE: 140 MMHG | OXYGEN SATURATION: 96 % | BODY MASS INDEX: 20.61 KG/M2 | DIASTOLIC BLOOD PRESSURE: 70 MMHG

## 2020-01-02 PROCEDURE — 99213 OFFICE O/P EST LOW 20 MIN: CPT

## 2020-01-02 RX ORDER — METOPROLOL TARTRATE 25 MG/1
25 TABLET, FILM COATED ORAL TWICE DAILY
Qty: 180 | Refills: 2 | Status: DISCONTINUED | COMMUNITY
End: 2020-01-02

## 2020-01-02 NOTE — HISTORY OF PRESENT ILLNESS
[FreeTextEntry1] : This is a 73 yo female s/p Ascending Aortic Replacement and AVR with Dr. Herrera on 10/21.  Pt reports that she is doing well, and has no complaints.  Medications reviewed and education provided.  Denies fever, SOB, pain, dizziness, palpitations.  Patient do report that she is having problems concentrating.\par Patient had recent visit to emergency room with near syncope. She was dehydrated. Her pulse rate is running low between 45-50. She increase hydration. She's feeling better.

## 2020-01-02 NOTE — ASSESSMENT
[FreeTextEntry1] : This is a 75 yo female s/p  Ascending aortic replacement and AVR with Dr. Herrera on 10/21.  Reports issues with "focusing with reading/texting"  No neuro deficits noted. Symptoms are getting better. Her concentration is much better. She is physically active. Her scar is healed. She denies any chest pains or shortness of breath. She is well controlled. We will refer to cardiac rehabilitation. Echocardiogram and exercise stress test reviewed. Patient will start cardiac rehabilitation. Patient is an aspirin 325 mg daily since her surgery. We will reduce the dose to 81 mg daily 3 months after surgery. \par Near syncope secondary to dehydration. Continue vigorous hydration.\par Last visit the dose of metoprolol was reduced that twice a day. According to patient  blood pressure is still running normal appearing cardiac rehabilitation. We will reduce the dose of amlodipine to 2.5 mg daily and continue heart rate and blood pressure monitoring. She reports no significant symptoms of any dizziness lightheadedness chest pains or shortness of breath.

## 2020-01-02 NOTE — PHYSICAL EXAM
[Normal Appearance] : normal appearance [Well Groomed] : well groomed [General Appearance - Well Developed] : well developed [General Appearance - Well Nourished] : well nourished [No Deformities] : no deformities [Eyelids - No Xanthelasma] : the eyelids demonstrated no xanthelasmas [Normal Conjunctiva] : the conjunctiva exhibited no abnormalities [General Appearance - In No Acute Distress] : no acute distress [Normal Jugular Venous V Waves Present] : normal jugular venous V waves present [Normal Jugular Venous A Waves Present] : normal jugular venous A waves present [No Jugular Venous Cronin A Waves] : no jugular venous cronin A waves [Respiration, Rhythm And Depth] : normal respiratory rhythm and effort [Exaggerated Use Of Accessory Muscles For Inspiration] : no accessory muscle use [Cyanosis, Localized] : no localized cyanosis [Petechial Hemorrhages (___cm)] : no petechial hemorrhages [Auscultation Breath Sounds / Voice Sounds] : lungs were clear to auscultation bilaterally [Heart Sounds Gallop] : no gallops [Murmurs] : no murmurs [Heart Rate And Rhythm] : heart rate was normal and rhythm regular [Heart Sounds] : normal S1 and S2 [Heart Sounds Pericardial Friction Rub] : no pericardial rub [Examination Of The Chest] : the chest was normal in appearance [Chest Visual Inspection Thoracic Asymmetry] : no chest asymmetry [Diminished Respiratory Excursion] : normal chest expansion [Abdomen Tenderness] : non-tender [Abdomen Soft] : soft [Bowel Sounds] : normal bowel sounds [Abdomen Mass (___ Cm)] : no abdominal mass palpated [Musculoskeletal - Swelling] : no joint swelling seen [Nail Clubbing] : no clubbing  or cyanosis of the fingernails [Abnormal Walk] : normal gait [Skin Color & Pigmentation] : normal skin color and pigmentation [Skin Turgor] : normal skin turgor [Motor Tone] : muscle strength and tone were normal [Sensation] : the sensory exam was normal to light touch and pinprick [] : no rash [Deep Tendon Reflexes (DTR)] : deep tendon reflexes were 2+ and symmetric [No Focal Deficits] : no focal deficits [Oriented To Time, Place, And Person] : oriented to person, place, and time [Impaired Insight] : insight and judgment were intact [Affect] : the affect was normal

## 2020-01-16 ENCOUNTER — TRANSCRIPTION ENCOUNTER (OUTPATIENT)
Age: 75
End: 2020-01-16

## 2020-02-13 ENCOUNTER — APPOINTMENT (OUTPATIENT)
Dept: CARDIOLOGY | Facility: CLINIC | Age: 75
End: 2020-02-13
Payer: MEDICARE

## 2020-02-13 VITALS
HEART RATE: 50 BPM | WEIGHT: 112 LBS | BODY MASS INDEX: 20.61 KG/M2 | SYSTOLIC BLOOD PRESSURE: 130 MMHG | HEIGHT: 62 IN | OXYGEN SATURATION: 97 % | DIASTOLIC BLOOD PRESSURE: 80 MMHG

## 2020-02-13 PROCEDURE — 99214 OFFICE O/P EST MOD 30 MIN: CPT

## 2020-02-13 NOTE — PHYSICAL EXAM
[General Appearance - Well Developed] : well developed [Well Groomed] : well groomed [Normal Appearance] : normal appearance [General Appearance - Well Nourished] : well nourished [No Deformities] : no deformities [General Appearance - In No Acute Distress] : no acute distress [Normal Conjunctiva] : the conjunctiva exhibited no abnormalities [Eyelids - No Xanthelasma] : the eyelids demonstrated no xanthelasmas [Normal Jugular Venous A Waves Present] : normal jugular venous A waves present [Normal Jugular Venous V Waves Present] : normal jugular venous V waves present [No Jugular Venous Cronin A Waves] : no jugular venous cronin A waves [Exaggerated Use Of Accessory Muscles For Inspiration] : no accessory muscle use [Respiration, Rhythm And Depth] : normal respiratory rhythm and effort [Cyanosis, Localized] : no localized cyanosis [Petechial Hemorrhages (___cm)] : no petechial hemorrhages [Auscultation Breath Sounds / Voice Sounds] : lungs were clear to auscultation bilaterally [Heart Rate And Rhythm] : heart rate was normal and rhythm regular [Heart Sounds Gallop] : no gallops [Heart Sounds] : normal S1 and S2 [Heart Sounds Pericardial Friction Rub] : no pericardial rub [Murmurs] : no murmurs [Examination Of The Chest] : the chest was normal in appearance [Diminished Respiratory Excursion] : normal chest expansion [Chest Visual Inspection Thoracic Asymmetry] : no chest asymmetry [Bowel Sounds] : normal bowel sounds [Abdomen Tenderness] : non-tender [Abdomen Soft] : soft [Abdomen Mass (___ Cm)] : no abdominal mass palpated [Abnormal Walk] : normal gait [Motor Tone] : muscle strength and tone were normal [Nail Clubbing] : no clubbing  or cyanosis of the fingernails [Musculoskeletal - Swelling] : no joint swelling seen [] : no rash [Skin Color & Pigmentation] : normal skin color and pigmentation [Skin Turgor] : normal skin turgor [Deep Tendon Reflexes (DTR)] : deep tendon reflexes were 2+ and symmetric [No Focal Deficits] : no focal deficits [Sensation] : the sensory exam was normal to light touch and pinprick [Oriented To Time, Place, And Person] : oriented to person, place, and time [Impaired Insight] : insight and judgment were intact [Affect] : the affect was normal

## 2020-02-13 NOTE — HISTORY OF PRESENT ILLNESS
[FreeTextEntry1] : This is a 75 yo female s/p Ascending Aortic Replacement and AVR with Dr. Herrera on 10/21/ 2019.  Pt reports that she is doing well, and has no complaints.  Medications reviewed and education provided.  Denies fever, SOB, pain, dizziness, palpitations.  Patient do report that she is having problems concentrating.\par Patient had recent visit to emergency room with near syncope. She was dehydrated. Her pulse rate is running low between 45-50. She increase hydration. She's feeling better.

## 2020-02-13 NOTE — ASSESSMENT
[FreeTextEntry1] : This is a 75 yo female s/p  Ascending aortic replacement and AVR with Dr. Herrera on 10/21.   She is physically active. Her scar is healed. She denies any chest pains or shortness of breath. She is well controlled. We will refer to cardiac rehabilitation. Echocardiogram and exercise stress test from last time reviewed. Patient will start cardiac rehabilitation. Patient is an aspirin 325 mg daily since her surgery. We will reduce the dose to 81 mg daily 3 months after surgery. \par Near syncope secondary to dehydration. Continue vigorous hydration.\par Last visit the dose of metoprolol was reduced that twice a day.  She reports no significant symptoms of any dizziness lightheadedness chest pains or shortness of breath.

## 2020-02-14 ENCOUNTER — APPOINTMENT (OUTPATIENT)
Dept: CT IMAGING | Facility: CLINIC | Age: 75
End: 2020-02-14
Payer: MEDICARE

## 2020-02-14 PROCEDURE — 71275 CT ANGIOGRAPHY CHEST: CPT

## 2020-02-14 PROCEDURE — Q9967E: CUSTOM

## 2020-02-14 PROCEDURE — 82565A: CUSTOM | Mod: QW

## 2020-03-16 ENCOUNTER — APPOINTMENT (OUTPATIENT)
Dept: CARDIOTHORACIC SURGERY | Facility: CLINIC | Age: 75
End: 2020-03-16

## 2020-03-16 NOTE — DATA REVIEWED
[FreeTextEntry1] : CT Angio from 02/14/20 at [facility]\par - Aortic sinus 3.7 cm\par - Sinotubular junction 3.3 cm\par - Tubular portion of the ascending aorta 3.4 cm\par - aorta at the level of main pulmonary artery 2.6 cm\par - mid descending thoracic aorta 2.6 cm\par - Aorta at the level of the diaphragm 2.6 cm\par \par \par Cardiac Cath on 8/1/19 revealed non-obstructive CAD with a 4.7cm ascending aortic aneurysm.\par \par CT Scan was performed on 7/1/19 demonstrating 4.4 cm at main pulmonary artery, 2.4 cm aortic annulus, 3.7 cm at Sinus of Valsalva.

## 2020-03-16 NOTE — HISTORY OF PRESENT ILLNESS
[FreeTextEntry1] : Ms. CASPER is a 73 year old female referred by Dr. Melendez who underwent Ascending Aneurysm Repair #32 dacron graft #23 Aguayo Aortic Valve Replacement on 10/21/20.  Her past medical history includes ascending aortic aneurysm (4.8 cm), HTN, HLD, mitral insufficiency, and severe aortic insufficiency.\par \par \par \par \par

## 2020-03-16 NOTE — CONSULT LETTER
[Dear  ___] : Dear  [unfilled], [Courtesy Letter:] : I had the pleasure of seeing your patient, [unfilled], in my office today. [Please see my note below.] : Please see my note below. [Consult Closing:] : Thank you very much for allowing me to participate in the care of this patient.  If you have any questions, please do not hesitate to contact me. [Sincerely,] : Sincerely, [FreeTextEntry2] : Farrah Melendez MD [FreeTextEntry3] : Deng Herrera MD\par  of Cardiothoracic Surgery\par [facility]\par [address] [place] [address] \[place] [address]\par [phone]\par

## 2020-04-16 ENCOUNTER — APPOINTMENT (OUTPATIENT)
Dept: CARDIOLOGY | Facility: CLINIC | Age: 75
End: 2020-04-16

## 2020-07-31 RX ORDER — ASPIRIN 325 MG/1
325 TABLET, FILM COATED ORAL DAILY
Qty: 90 | Refills: 2 | Status: DISCONTINUED | COMMUNITY
Start: 2019-11-21 | End: 2020-07-31

## 2020-08-03 RX ORDER — ASPIRIN ENTERIC COATED TABLETS 81 MG 81 MG/1
81 TABLET, DELAYED RELEASE ORAL
Qty: 90 | Refills: 3 | Status: ACTIVE | COMMUNITY
Start: 2020-07-31 | End: 1900-01-01

## 2020-08-06 ENCOUNTER — RX RENEWAL (OUTPATIENT)
Age: 75
End: 2020-08-06

## 2020-08-10 ENCOUNTER — APPOINTMENT (OUTPATIENT)
Dept: CARDIOLOGY | Facility: CLINIC | Age: 75
End: 2020-08-10
Payer: MEDICARE

## 2020-08-10 VITALS
WEIGHT: 112 LBS | BODY MASS INDEX: 20.61 KG/M2 | SYSTOLIC BLOOD PRESSURE: 100 MMHG | HEIGHT: 62 IN | DIASTOLIC BLOOD PRESSURE: 58 MMHG | OXYGEN SATURATION: 98 % | HEART RATE: 56 BPM

## 2020-08-10 PROCEDURE — 99214 OFFICE O/P EST MOD 30 MIN: CPT

## 2020-08-10 RX ORDER — RANITIDINE HYDROCHLORIDE 150 MG/1
150 TABLET, FILM COATED ORAL
Refills: 0 | Status: DISCONTINUED | COMMUNITY
End: 2020-08-10

## 2020-08-10 NOTE — ASSESSMENT
[FreeTextEntry1] : This is a 73 yo female s/p  Ascending aortic replacement and AVR with Dr. Herrera on 10/21.   She is physically active. Her scar is healed. She denies any chest pains or shortness of breath. She is well controlled. We will refer to cardiac rehabilitation. Echocardiogram and exercise stress test from last time reviewed. Patient will start cardiac rehabilitation. P\par Near syncope secondary to dehydration. Continue vigorous hydration.\par Last visit the dose of metoprolol was reduced that twice a day.  She reports no significant symptoms of any dizziness lightheadedness chest pains or shortness of breath.

## 2020-08-10 NOTE — PHYSICAL EXAM
[General Appearance - Well Developed] : well developed [Well Groomed] : well groomed [Normal Appearance] : normal appearance [General Appearance - Well Nourished] : well nourished [General Appearance - In No Acute Distress] : no acute distress [No Deformities] : no deformities [Normal Conjunctiva] : the conjunctiva exhibited no abnormalities [Eyelids - No Xanthelasma] : the eyelids demonstrated no xanthelasmas [Normal Jugular Venous A Waves Present] : normal jugular venous A waves present [Normal Jugular Venous V Waves Present] : normal jugular venous V waves present [No Jugular Venous Cronin A Waves] : no jugular venous cronin A waves [Respiration, Rhythm And Depth] : normal respiratory rhythm and effort [Exaggerated Use Of Accessory Muscles For Inspiration] : no accessory muscle use [Petechial Hemorrhages (___cm)] : no petechial hemorrhages [Cyanosis, Localized] : no localized cyanosis [Auscultation Breath Sounds / Voice Sounds] : lungs were clear to auscultation bilaterally [Heart Sounds] : normal S1 and S2 [Heart Sounds Gallop] : no gallops [Heart Rate And Rhythm] : heart rate was normal and rhythm regular [Heart Sounds Pericardial Friction Rub] : no pericardial rub [Murmurs] : no murmurs [Examination Of The Chest] : the chest was normal in appearance [Chest Visual Inspection Thoracic Asymmetry] : no chest asymmetry [Diminished Respiratory Excursion] : normal chest expansion [Bowel Sounds] : normal bowel sounds [Abdomen Tenderness] : non-tender [Abdomen Soft] : soft [Abdomen Mass (___ Cm)] : no abdominal mass palpated [Abnormal Walk] : normal gait [Nail Clubbing] : no clubbing  or cyanosis of the fingernails [Motor Tone] : muscle strength and tone were normal [Musculoskeletal - Swelling] : no joint swelling seen [Skin Turgor] : normal skin turgor [Skin Color & Pigmentation] : normal skin color and pigmentation [] : no rash [Deep Tendon Reflexes (DTR)] : deep tendon reflexes were 2+ and symmetric [No Focal Deficits] : no focal deficits [Sensation] : the sensory exam was normal to light touch and pinprick [Oriented To Time, Place, And Person] : oriented to person, place, and time [Impaired Insight] : insight and judgment were intact [Affect] : the affect was normal

## 2020-08-10 NOTE — HISTORY OF PRESENT ILLNESS
[FreeTextEntry1] : This is a 75 yo female s/p Ascending Aortic Replacement and AVR with Dr. Herrera on 10/21/ 2019.  Pt reports that she is doing well, and has no complaints.  Medications reviewed and education provided.  Denies fever, SOB, pain, dizziness, palpitations.  Patient do report that she is having problems concentrating.\par

## 2020-10-21 ENCOUNTER — APPOINTMENT (OUTPATIENT)
Dept: CARDIOLOGY | Facility: CLINIC | Age: 75
End: 2020-10-21
Payer: MEDICARE

## 2020-10-21 ENCOUNTER — NON-APPOINTMENT (OUTPATIENT)
Age: 75
End: 2020-10-21

## 2020-10-21 VITALS
WEIGHT: 118 LBS | HEIGHT: 62 IN | BODY MASS INDEX: 21.71 KG/M2 | OXYGEN SATURATION: 98 % | SYSTOLIC BLOOD PRESSURE: 162 MMHG | HEART RATE: 47 BPM | DIASTOLIC BLOOD PRESSURE: 70 MMHG

## 2020-10-21 PROCEDURE — 0296T: CPT

## 2020-10-21 PROCEDURE — 99214 OFFICE O/P EST MOD 30 MIN: CPT

## 2020-10-21 PROCEDURE — 93000 ELECTROCARDIOGRAM COMPLETE: CPT

## 2020-10-21 NOTE — HISTORY OF PRESENT ILLNESS
[FreeTextEntry1] : This is a 76 yo female s/p Ascending Aortic Replacement and AVR with Dr. Herrera on 10/21/ 2019.  Pt reports that she is doing well, and has no complaints.  Medications reviewed and education provided.  Denies fever, SOB, pain, dizziness, palpitations.  Patient do report that she is having problems concentrating.\par

## 2020-10-28 ENCOUNTER — APPOINTMENT (OUTPATIENT)
Dept: CARDIOLOGY | Facility: CLINIC | Age: 75
End: 2020-10-28
Payer: MEDICARE

## 2020-10-28 ENCOUNTER — MED ADMIN CHARGE (OUTPATIENT)
Age: 75
End: 2020-10-28

## 2020-10-28 VITALS
TEMPERATURE: 96.9 F | WEIGHT: 116 LBS | HEART RATE: 50 BPM | SYSTOLIC BLOOD PRESSURE: 162 MMHG | HEIGHT: 62 IN | OXYGEN SATURATION: 98 % | BODY MASS INDEX: 21.35 KG/M2 | DIASTOLIC BLOOD PRESSURE: 78 MMHG

## 2020-10-28 DIAGNOSIS — R06.00 DYSPNEA, UNSPECIFIED: ICD-10-CM

## 2020-10-28 PROCEDURE — 99214 OFFICE O/P EST MOD 30 MIN: CPT

## 2020-10-28 RX ORDER — METOPROLOL TARTRATE 25 MG/1
25 TABLET, FILM COATED ORAL TWICE DAILY
Qty: 180 | Refills: 1 | Status: DISCONTINUED | COMMUNITY
Start: 2020-07-31 | End: 2020-10-28

## 2020-10-28 NOTE — REASON FOR VISIT
[Follow-Up - Clinic] : a clinic follow-up of [Hyperlipidemia] : hyperlipidemia [Hypertension] : hypertension [FreeTextEntry1] : bradycardia, s/p AVR

## 2020-10-28 NOTE — HISTORY OF PRESENT ILLNESS
[FreeTextEntry1] : This is a 74 yo female here today for routine followup.\par S/P Ascending Aneurysm Repair #32 dacron graft #23 Aguayo Aortic Valve Replacement. Surgery Date: 10/21/19. PMH of HTN, HLD, bradycardia, nonobstructive CAD. \par No prior hx of MI or coronary revascularization. \par \par Last seen a week ago and noted with HTN and bradycardia. Monitor was placed which is not yet resulted. She feels rare dizzy episodes. There is no syncope. She does not monitor BP at home. She denies chest pain, pressure, palpitations, unusual shortness of breath, orthopnea, LE edema, weight gain. \par \par Past tests:\par Labs 12/2019, K 4.1, creat 0.61, LDL 78\par Carotid US 10/2019 mild nonobstructive\par Abd US 2013 mild nonobx plaque, no aneurysm

## 2020-10-28 NOTE — PHYSICAL EXAM
[General Appearance - Well Developed] : well developed [Normal Appearance] : normal appearance [Well Groomed] : well groomed [General Appearance - Well Nourished] : well nourished [No Deformities] : no deformities [General Appearance - In No Acute Distress] : no acute distress [Respiration, Rhythm And Depth] : normal respiratory rhythm and effort [Exaggerated Use Of Accessory Muscles For Inspiration] : no accessory muscle use [Auscultation Breath Sounds / Voice Sounds] : lungs were clear to auscultation bilaterally [Heart Rate And Rhythm] : heart rate and rhythm were normal [Heart Sounds] : normal S1 and S2 [Edema] : no peripheral edema present [Abdomen Soft] : soft [Abdomen Tenderness] : non-tender [Abdomen Mass (___ Cm)] : no abdominal mass palpated [Abnormal Walk] : normal gait [Gait - Sufficient For Exercise Testing] : the gait was sufficient for exercise testing [Nail Clubbing] : no clubbing of the fingernails [Cyanosis, Localized] : no localized cyanosis [Petechial Hemorrhages (___cm)] : no petechial hemorrhages [] : no ischemic changes [Skin Color & Pigmentation] : normal skin color and pigmentation [Oriented To Time, Place, And Person] : oriented to person, place, and time [Affect] : the affect was normal [FreeTextEntry1] : 1/6 GINO

## 2020-10-28 NOTE — REVIEW OF SYSTEMS
[Feeling Fatigued] : feeling fatigued [see HPI] : see HPI [Dizziness] : dizziness [Memory Lapses Or Loss] : memory lapses or loss [Negative] : Heme/Lymph [Recent Weight Gain (___ Lbs)] : no recent weight gain

## 2020-10-28 NOTE — ASSESSMENT
[FreeTextEntry1] : \par 1. S/P BP aortic valve replacement and aortic aneurysm repair 10/21/2019\par - Clinically doing well without evidence of volume overload\par - CT in Feb showed stable findings. Surveillance echocardiogram will be obtained. \par - On aortic registry with Dr. Herrera's office, will coordinate care. \par - Advised more aggressive BP control.\par \par 2. HTN\par - Reviewed goal < 130/80\par - D/T bradycardia titrate off of beta blocker, one tab for 5 days then stop.\par - Start lisinopril 10mg daily. Check BMP one week.\par - Cont norvasc 5mg daily.\par - Low salt diet\par \par 3. HLD - Cont simvastatin. Repeat lipids.\par \par 4. Nonobstrucitve CAD\par - Denies anginal complaint. Surveillance of LVEF and wall motion by echo. \par - Cont risk factor modification and medical rx - ASA, statin. Bradycardia limits use of BB.\par \par F/U in 2 months to review echo and check BP. \par She will keep BP log to review as well. \par Any questions and concerns were addressed and resolved. \par \par Sincerely,\par \par TITUS Garcia\par Patients history, testing, and plan reviewed with supervising MD: Dr. Farrah Melendez

## 2020-10-30 PROCEDURE — 0298T: CPT

## 2020-11-09 ENCOUNTER — APPOINTMENT (OUTPATIENT)
Dept: CARDIOLOGY | Facility: CLINIC | Age: 75
End: 2020-11-09
Payer: MEDICARE

## 2020-11-09 PROCEDURE — 93306 TTE W/DOPPLER COMPLETE: CPT

## 2020-11-13 ENCOUNTER — APPOINTMENT (OUTPATIENT)
Dept: CARDIOLOGY | Facility: CLINIC | Age: 75
End: 2020-11-13
Payer: MEDICARE

## 2020-11-13 VITALS
DIASTOLIC BLOOD PRESSURE: 80 MMHG | BODY MASS INDEX: 21.16 KG/M2 | HEART RATE: 76 BPM | OXYGEN SATURATION: 98 % | HEIGHT: 62 IN | WEIGHT: 115 LBS | TEMPERATURE: 97.8 F | SYSTOLIC BLOOD PRESSURE: 124 MMHG

## 2020-11-13 PROCEDURE — 99214 OFFICE O/P EST MOD 30 MIN: CPT

## 2020-11-13 NOTE — HISTORY OF PRESENT ILLNESS
[FreeTextEntry1] : This is a 76 yo female here today for routine followup.\par S/P Ascending Aneurysm Repair #32 dacron graft #23 Aguayo Aortic Valve Replacement. Surgery Date: 10/21/19. PMH of HTN, HLD, bradycardia, nonobstructive CAD. \par No prior hx of MI or coronary revascularization. \par \par Last seen a week ago and noted with HTN and bradycardia. Monitor was placed which is not yet resulted. She feels rare dizzy episodes. There is no syncope. She does not monitor BP at home. She denies chest pain, pressure, palpitations, unusual shortness of breath, orthopnea, LE edema, weight gain. \par \par Past tests:\par Labs 12/2019, K 4.1, creat 0.61, LDL 78\par Carotid US 10/2019 mild nonobstructive\par Abd US 2013 mild nonobx plaque, no aneurysm

## 2020-11-13 NOTE — ASSESSMENT
[FreeTextEntry1] : \par 1. S/P BP aortic valve replacement and aortic aneurysm repair 10/21/2019\par - Clinically doing well without evidence of volume overload\par - CT in Feb showed stable findings. Surveillance echocardiogram will be obtained. \par - On aortic registry with Dr. Herrera's office, will coordinate care. \par - Advised more aggressive BP control.\par \par 2. HTN\par - Reviewed goal < 130/80\par - D/T bradycardia titrate off of beta blocker, one tab for 5 days then stop.\par - Start lisinopril 10mg daily. Check BMP one week.\par - Cont norvasc 5mg daily.\par - Low salt diet\par \par 3. HLD - Cont simvastatin. Repeat lipids.\par \par 4. Nonobstrucitve CAD\par - Denies anginal complaint. Surveillance of LVEF and wall motion by echo. \par - Cont risk factor modification and medical rx - ASA, statin. Bradycardia limits use of BB.\par \par She was weaned off beta-blocker because of bradycardia.  She feels better.  Less fatigue.  Blood pressure is well controlled on present medications.  Patient's echocardiogram was done recently and was reviewed.  Aortic prosthesis is normally functioning.  Normal ejection fraction.\par Cardiac follow-up 6 months and as needed.

## 2020-12-28 ENCOUNTER — NON-APPOINTMENT (OUTPATIENT)
Age: 75
End: 2020-12-28

## 2020-12-28 ENCOUNTER — APPOINTMENT (OUTPATIENT)
Dept: CARDIOLOGY | Facility: CLINIC | Age: 75
End: 2020-12-28
Payer: MEDICARE

## 2020-12-28 VITALS
DIASTOLIC BLOOD PRESSURE: 60 MMHG | HEIGHT: 62 IN | WEIGHT: 116 LBS | BODY MASS INDEX: 21.35 KG/M2 | SYSTOLIC BLOOD PRESSURE: 130 MMHG | TEMPERATURE: 97.1 F | OXYGEN SATURATION: 99 % | HEART RATE: 73 BPM

## 2020-12-28 DIAGNOSIS — Z01.810 ENCOUNTER FOR PREPROCEDURAL CARDIOVASCULAR EXAMINATION: ICD-10-CM

## 2020-12-28 PROCEDURE — 93000 ELECTROCARDIOGRAM COMPLETE: CPT

## 2020-12-28 NOTE — ASSESSMENT
[FreeTextEntry1] : \par 1. S/P BP aortic valve replacement and aortic aneurysm repair 10/21/2019\par - Clinically doing well without evidence of volume overload\par - CT in Feb showed stable findings. Surveillance echocardiogram will be obtained. \par - On aortic registry with Dr. Herrera's office, will coordinate care. \par - Advised more aggressive BP control.\par \par 2. HTN\par - Reviewed goal < 130/80\par - D/T bradycardia titrate off of beta blocker, one tab for 5 days then stop.\par - Start lisinopril 10mg daily. Check BMP one week.\par - Cont norvasc 5mg daily.\par - Low salt diet\par \par 3. HLD - Cont simvastatin. Repeat lipids.\par \par 4. Nonobstrucitve CAD\par - Denies anginal complaint. Surveillance of LVEF and wall motion by echo. \par - Cont risk factor modification and medical rx - ASA, statin. Bradycardia limits use of BB.\par \par She was weaned off beta-blocker because of bradycardia.  She feels better.  Less fatigue.  Blood pressure is well controlled on present medications.  Patient's echocardiogram was done recently and was reviewed.  Aortic prosthesis is normally functioning.  Normal ejection fraction.\par Cardiac follow-up 6 months and as needed.\par \par Patient is preop for knee surgery.  ECG was reviewed.  No change compared to previous ECG.  Overall she is clinically doing very well and is compensated.  No significant risk for cardiovascular events during anticipated surgery.

## 2020-12-28 NOTE — HISTORY OF PRESENT ILLNESS
[FreeTextEntry1] : This is a 76 yo female here today for routine followup.\par S/P Ascending Aneurysm Repair #32 dacron graft #23 Aguayo Aortic Valve Replacement. Surgery Date: 10/21/19. PMH of HTN, HLD, bradycardia, nonobstructive CAD. \par No prior hx of MI or coronary revascularization. \par \par Last seen a week ago and noted with HTN and bradycardia. Monitor was placed which is not yet resulted. She feels rare dizzy episodes. There is no syncope. She does not monitor BP at home. She denies chest pain, pressure, palpitations, unusual shortness of breath, orthopnea, LE edema, weight gain. \par \par Past tests:\par Labs 12/2019, K 4.1, creat 0.61, LDL 78\par Carotid US 10/2019 mild nonobstructive\par Abd US 2013 mild nonobx plaque, no aneurysm\par Patient is preop for knee surgery.

## 2020-12-29 ENCOUNTER — OUTPATIENT (OUTPATIENT)
Dept: OUTPATIENT SERVICES | Facility: HOSPITAL | Age: 75
LOS: 1 days | End: 2020-12-29

## 2020-12-29 DIAGNOSIS — Z98.890 OTHER SPECIFIED POSTPROCEDURAL STATES: Chronic | ICD-10-CM

## 2021-01-09 ENCOUNTER — APPOINTMENT (OUTPATIENT)
Dept: DISASTER EMERGENCY | Facility: CLINIC | Age: 76
End: 2021-01-09

## 2021-01-09 DIAGNOSIS — Z01.818 ENCOUNTER FOR OTHER PREPROCEDURAL EXAMINATION: ICD-10-CM

## 2021-01-11 LAB — SARS-COV-2 N GENE NPH QL NAA+PROBE: NOT DETECTED

## 2021-01-12 ENCOUNTER — OUTPATIENT (OUTPATIENT)
Dept: OUTPATIENT SERVICES | Facility: HOSPITAL | Age: 76
LOS: 1 days | End: 2021-01-12

## 2021-01-12 ENCOUNTER — INPATIENT (INPATIENT)
Facility: HOSPITAL | Age: 76
LOS: 1 days | Discharge: HOME HEALTH | End: 2021-01-14

## 2021-01-12 DIAGNOSIS — Z98.890 OTHER SPECIFIED POSTPROCEDURAL STATES: Chronic | ICD-10-CM

## 2021-01-13 ENCOUNTER — OUTPATIENT (OUTPATIENT)
Dept: OUTPATIENT SERVICES | Facility: HOSPITAL | Age: 76
LOS: 1 days | End: 2021-01-13

## 2021-01-13 DIAGNOSIS — Z98.890 OTHER SPECIFIED POSTPROCEDURAL STATES: Chronic | ICD-10-CM

## 2021-01-14 ENCOUNTER — OUTPATIENT (OUTPATIENT)
Dept: OUTPATIENT SERVICES | Facility: HOSPITAL | Age: 76
LOS: 1 days | End: 2021-01-14

## 2021-01-14 DIAGNOSIS — Z98.890 OTHER SPECIFIED POSTPROCEDURAL STATES: Chronic | ICD-10-CM

## 2021-04-05 ENCOUNTER — NON-APPOINTMENT (OUTPATIENT)
Age: 76
End: 2021-04-05

## 2021-05-12 ENCOUNTER — APPOINTMENT (OUTPATIENT)
Dept: CARDIOLOGY | Facility: CLINIC | Age: 76
End: 2021-05-12

## 2021-05-12 NOTE — H&P PST ADULT - CONSTITUTIONAL
Well-developed, well nourished
I will SWITCH the dose or number of times a day I take the medications listed below when I get home from the hospital:  None

## 2021-05-18 NOTE — DISCHARGE NOTE PROVIDER - NSDCACTIVITY_GEN_ALL_CORE
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  Patient called and informed the medication was sent to the compounding pharmacy and he will be called to set up delivery    No heavy lifting/straining/Showering allowed/Do not drive or operate machinery/Driving allowed/Walking - Outdoors allowed/Walking - Indoors allowed/Do not make important decisions

## 2021-06-28 ENCOUNTER — APPOINTMENT (OUTPATIENT)
Dept: CARDIOLOGY | Facility: CLINIC | Age: 76
End: 2021-06-28
Payer: MEDICARE

## 2021-06-28 VITALS
WEIGHT: 115 LBS | BODY MASS INDEX: 21.16 KG/M2 | OXYGEN SATURATION: 99 % | DIASTOLIC BLOOD PRESSURE: 64 MMHG | HEIGHT: 62 IN | HEART RATE: 66 BPM | SYSTOLIC BLOOD PRESSURE: 126 MMHG | TEMPERATURE: 97.6 F

## 2021-06-28 DIAGNOSIS — Z23 ENCOUNTER FOR IMMUNIZATION: ICD-10-CM

## 2021-06-28 PROCEDURE — 99214 OFFICE O/P EST MOD 30 MIN: CPT

## 2021-06-28 NOTE — ASSESSMENT
[FreeTextEntry1] : \par 1. S/P BP aortic valve replacement and aortic aneurysm repair 10/21/2019\par - Clinically doing well without evidence of volume overload\par \par \par 2. HTN\par - Reviewed goal < 130/80\par \par - Low salt diet\par \par 3. HLD - Cont simvastatin. Repeat lipids.\par \par 4. Nonobstrucitve CAD\par - Denies anginal complaint. Surveillance of LVEF and wall motion by echo. \par - Cont risk factor modification and medical rx - ASA, statin. Bradycardia limits use of BB.\par \par She was weaned off beta-blocker because of bradycardia.  She feels better.  Less fatigue.  Blood pressure is well controlled on present medications.  Patient's echocardiogram was done recently and was reviewed.  Aortic prosthesis is normally functioning.  Normal ejection fraction.\par Cardiac follow-up 6 months and as needed.\par \par Status post knee replacement.  Doing very well.  Physically active.  Back to golfing.  Will be due to recheck echocardiogram this year.  Follow-up after echo in 6 months.

## 2021-06-28 NOTE — HISTORY OF PRESENT ILLNESS
[FreeTextEntry1] : This is a 74 yo female here today for routine followup.\par S/P Ascending Aneurysm Repair #32 dacron graft #23 Aguayo Aortic Valve Replacement. Surgery Date: 10/21/19. PMH of HTN, HLD, bradycardia, nonobstructive CAD. \par No prior hx of MI or coronary revascularization. \par \par Last seen a week ago and noted with HTN and bradycardia. Monitor was placed which is not yet resulted. She feels rare dizzy episodes. There is no syncope. She does not monitor BP at home. She denies chest pain, pressure, palpitations, unusual shortness of breath, orthopnea, LE edema, weight gain. \par \par Past tests:\par Labs 12/2019, K 4.1, creat 0.61, LDL 78\par Carotid US 10/2019 mild nonobstructive\par Abd US 2013 mild nonobx plaque, no aneurysm\par Patient is preop for knee surgery.

## 2021-07-09 ENCOUNTER — APPOINTMENT (OUTPATIENT)
Dept: CARDIOLOGY | Facility: CLINIC | Age: 76
End: 2021-07-09
Payer: MEDICARE

## 2021-07-09 PROCEDURE — 93306 TTE W/DOPPLER COMPLETE: CPT

## 2021-07-19 ENCOUNTER — APPOINTMENT (OUTPATIENT)
Dept: CARDIOLOGY | Facility: CLINIC | Age: 76
End: 2021-07-19
Payer: MEDICARE

## 2021-07-19 VITALS
DIASTOLIC BLOOD PRESSURE: 62 MMHG | TEMPERATURE: 98.4 F | WEIGHT: 112 LBS | SYSTOLIC BLOOD PRESSURE: 104 MMHG | OXYGEN SATURATION: 99 % | BODY MASS INDEX: 20.61 KG/M2 | HEIGHT: 62 IN | HEART RATE: 72 BPM

## 2021-07-19 PROCEDURE — 99215 OFFICE O/P EST HI 40 MIN: CPT

## 2021-07-19 NOTE — HISTORY OF PRESENT ILLNESS
[FreeTextEntry1] : This is a 76 yo female here today for  followup.\par S/P Ascending Aneurysm Repair #32 dacron graft #23 Aguayo Aortic Valve Replacement. Surgery Date: 10/21/19. PMH of HTN, HLD, bradycardia, nonobstructive CAD. \par No prior hx of MI or coronary revascularization. \par \par \par Past tests:\par Labs 12/2019, K 4.1, creat 0.61, LDL 78\par Carotid US 10/2019 mild nonobstructive\par Abd US 2013 mild nonobx plaque, no aneurysm\par

## 2021-07-19 NOTE — ASSESSMENT
[FreeTextEntry1] : \par 1. S/P BP aortic valve replacement and aortic aneurysm repair 10/21/2019\par - Clinically doing well without evidence of volume overload\par \par \par 2. HTN\par - Reviewed goal < 130/80\par \par - Low salt diet\par \par 3. HLD - Cont simvastatin. Repeat lipids.\par \par 4. Nonobstrucitve CAD\par - Denies anginal complaint. Surveillance of LVEF and wall motion by echo. \par - Cont risk factor modification and medical rx - ASA, statin. Bradycardia limits use of BB.\par \par She was weaned off beta-blocker because of bradycardia.  She feels better.  Less fatigue.  Blood pressure is well controlled on present medications.  Patient's echocardiogram was done recently and was reviewed.  Aortic prosthesis is normally functioning.  Normal ejection fraction.\par Cardiac follow-up 6 months and as needed.\par \par Status post knee replacement.  Doing very well.  Physically active.  Back to golfing.  Will be due to recheck echocardiogram this year.  Follow-up after echo in 6 months.\par Recent echocardiogram reviewed.  Results reviewed with the patient.  Continue present medications.  Vigorous hydration to be continued.  Cardiac follow-up 6 months and as needed.

## 2021-07-19 NOTE — PHYSICAL EXAM
[Well Developed] : well developed [Well Nourished] : well nourished [No Acute Distress] : no acute distress [Normal Conjunctiva] : normal conjunctiva [Normal Venous Pressure] : normal venous pressure [No Carotid Bruit] : no carotid bruit [Normal S1, S2] : normal S1, S2 [No Murmur] : no murmur [No Rub] : no rub [No Gallop] : no gallop [Clear Lung Fields] : clear lung fields [Good Air Entry] : good air entry [No Respiratory Distress] : no respiratory distress  [Soft] : abdomen soft [Non Tender] : non-tender [No Masses/organomegaly] : no masses/organomegaly [Normal Bowel Sounds] : normal bowel sounds [Normal Gait] : normal gait [No Edema] : no edema [No Cyanosis] : no cyanosis [No Clubbing] : no clubbing [No Varicosities] : no varicosities [No Rash] : no rash [No Skin Lesions] : no skin lesions [Moves all extremities] : moves all extremities [No Focal Deficits] : no focal deficits [Normal Speech] : normal speech [Alert and Oriented] : alert and oriented [Normal memory] : normal memory [General Appearance - Well Developed] : well developed [Normal Appearance] : normal appearance [Well Groomed] : well groomed [General Appearance - Well Nourished] : well nourished [No Deformities] : no deformities [General Appearance - In No Acute Distress] : no acute distress [Respiration, Rhythm And Depth] : normal respiratory rhythm and effort [Exaggerated Use Of Accessory Muscles For Inspiration] : no accessory muscle use [Auscultation Breath Sounds / Voice Sounds] : lungs were clear to auscultation bilaterally [Heart Rate And Rhythm] : heart rate and rhythm were normal [Heart Sounds] : normal S1 and S2 [Edema] : no peripheral edema present [Abdomen Soft] : soft [Abdomen Tenderness] : non-tender [Abdomen Mass (___ Cm)] : no abdominal mass palpated [Abnormal Walk] : normal gait [Gait - Sufficient For Exercise Testing] : the gait was sufficient for exercise testing [Nail Clubbing] : no clubbing of the fingernails [Cyanosis, Localized] : no localized cyanosis [Petechial Hemorrhages (___cm)] : no petechial hemorrhages [] : no ischemic changes [Skin Color & Pigmentation] : normal skin color and pigmentation [Oriented To Time, Place, And Person] : oriented to person, place, and time [Affect] : the affect was normal [FreeTextEntry1] : 1/6 GINO

## 2021-11-30 NOTE — H&P PST ADULT - MURMUR TIMING
pt received to intake room 1, c/o abdominal pain x1 week and N/V x2 days. endorses inability to tolerate PO. pt c/o pain and tenderness to epigastric/periumbilical region. 20G IV placed right AC, labs drawn and sent pt medicated per MD order. systolic

## 2022-02-03 ENCOUNTER — APPOINTMENT (OUTPATIENT)
Dept: CARDIOLOGY | Facility: CLINIC | Age: 77
End: 2022-02-03
Payer: MEDICARE

## 2022-02-03 ENCOUNTER — NON-APPOINTMENT (OUTPATIENT)
Age: 77
End: 2022-02-03

## 2022-02-03 VITALS
WEIGHT: 118 LBS | HEART RATE: 59 BPM | SYSTOLIC BLOOD PRESSURE: 148 MMHG | DIASTOLIC BLOOD PRESSURE: 62 MMHG | HEIGHT: 62 IN | BODY MASS INDEX: 21.71 KG/M2 | OXYGEN SATURATION: 99 %

## 2022-02-03 DIAGNOSIS — I35.1 NONRHEUMATIC AORTIC (VALVE) INSUFFICIENCY: ICD-10-CM

## 2022-02-03 PROCEDURE — 93000 ELECTROCARDIOGRAM COMPLETE: CPT

## 2022-02-03 PROCEDURE — 99214 OFFICE O/P EST MOD 30 MIN: CPT

## 2022-02-03 NOTE — ASSESSMENT
[FreeTextEntry1] : \par 1. S/P BP aortic valve replacement and aortic aneurysm repair 10/21/2019\par - Clinically doing well without evidence of volume overload\par \par \par 2. HTN\par - Reviewed goal < 130/80\par \par - Low salt diet\par \par 3. HLD - Cont simvastatin. Repeat lipids.\par \par 4. Nonobstrucitve CAD\par - Denies anginal complaint. Surveillance of LVEF and wall motion by echo. \par - Cont risk factor modification and medical rx - ASA, statin. Bradycardia limits use of BB.\par \par She was weaned off beta-blocker because of bradycardia.  She feels better.  Less fatigue.  Blood pressure is well controlled on present medications.  Patient's echocardiogram was done recently and was reviewed.  Aortic prosthesis is normally functioning.  Normal ejection fraction.\par Cardiac follow-up 6 months and as needed.\par \par Status post knee replacement.  Doing very well.  Physically active.  Back to [employer].  Will be due to recheck echocardiogram this year.  Follow-up after echo in 6 months. echocardiogram reviewed.  Results reviewed with the patient.  \par Blood pressure is slightly elevated.  Will increase the dose of lisinopril to 20 mg daily.  Continue blood pressure monitoring at home.  Low-sodium diet discussed with the patient.  We will recheck blood pressure in a couple of weeks and make further adjustments if necessary.

## 2022-02-21 ENCOUNTER — APPOINTMENT (OUTPATIENT)
Dept: CARDIOLOGY | Facility: CLINIC | Age: 77
End: 2022-02-21
Payer: MEDICARE

## 2022-02-21 VITALS
SYSTOLIC BLOOD PRESSURE: 118 MMHG | HEART RATE: 56 BPM | TEMPERATURE: 97 F | WEIGHT: 118 LBS | BODY MASS INDEX: 21.71 KG/M2 | HEIGHT: 62 IN | OXYGEN SATURATION: 98 % | DIASTOLIC BLOOD PRESSURE: 62 MMHG

## 2022-02-21 PROCEDURE — 99214 OFFICE O/P EST MOD 30 MIN: CPT

## 2022-02-21 RX ORDER — LISINOPRIL 10 MG/1
10 TABLET ORAL DAILY
Qty: 90 | Refills: 2 | Status: DISCONTINUED | COMMUNITY
Start: 2020-10-28 | End: 2022-02-21

## 2022-02-21 NOTE — PHYSICAL EXAM
[Well Developed] : well developed [Well Nourished] : well nourished [No Acute Distress] : no acute distress [Normal Conjunctiva] : normal conjunctiva [Normal Venous Pressure] : normal venous pressure [No Carotid Bruit] : no carotid bruit [Normal S1, S2] : normal S1, S2 [No Murmur] : no murmur [No Rub] : no rub [No Gallop] : no gallop [Clear Lung Fields] : clear lung fields [Good Air Entry] : good air entry [No Respiratory Distress] : no respiratory distress  [Soft] : abdomen soft [Non Tender] : non-tender [No Masses/organomegaly] : no masses/organomegaly [Normal Bowel Sounds] : normal bowel sounds [Normal Gait] : normal gait [No Edema] : no edema [No Cyanosis] : no cyanosis [No Clubbing] : no clubbing [No Varicosities] : no varicosities [No Rash] : no rash [No Skin Lesions] : no skin lesions [Moves all extremities] : moves all extremities [No Focal Deficits] : no focal deficits [Normal Speech] : normal speech [Normal memory] : normal memory [Alert and Oriented] : alert and oriented [General Appearance - Well Developed] : well developed [Normal Appearance] : normal appearance [Well Groomed] : well groomed [General Appearance - Well Nourished] : well nourished [No Deformities] : no deformities [General Appearance - In No Acute Distress] : no acute distress [Respiration, Rhythm And Depth] : normal respiratory rhythm and effort [Exaggerated Use Of Accessory Muscles For Inspiration] : no accessory muscle use [Heart Rate And Rhythm] : heart rate and rhythm were normal [Auscultation Breath Sounds / Voice Sounds] : lungs were clear to auscultation bilaterally [Heart Sounds] : normal S1 and S2 [Edema] : no peripheral edema present [Abdomen Soft] : soft [Abdomen Tenderness] : non-tender [Abdomen Mass (___ Cm)] : no abdominal mass palpated [Abnormal Walk] : normal gait [Gait - Sufficient For Exercise Testing] : the gait was sufficient for exercise testing [Nail Clubbing] : no clubbing of the fingernails [Cyanosis, Localized] : no localized cyanosis [Petechial Hemorrhages (___cm)] : no petechial hemorrhages [] : no ischemic changes [Skin Color & Pigmentation] : normal skin color and pigmentation [Oriented To Time, Place, And Person] : oriented to person, place, and time [Affect] : the affect was normal [FreeTextEntry1] : 1/6 GINO

## 2022-02-21 NOTE — ASSESSMENT
[FreeTextEntry1] : \par 1. S/P BP aortic valve replacement and aortic aneurysm repair 10/21/2019\par - Clinically doing well without evidence of volume overload\par \par \par 2. HTN\par - Reviewed goal < 130/80\par \par - Low salt diet\par \par 3. HLD - Cont simvastatin. Repeat lipids.\par \par 4. Nonobstrucitve CAD\par - Denies anginal complaint. Surveillance of LVEF and wall motion by echo. \par - Cont risk factor modification and medical rx - ASA, statin. Bradycardia limits use of BB.\par \par She was weaned off beta-blocker because of bradycardia.  She feels better.  Less fatigue.  Blood pressure is well controlled on present medications.  Patient's echocardiogram was done recently and was reviewed.  Aortic prosthesis is normally functioning.  Normal ejection fraction.\par Cardiac follow-up 3 months and as needed.\par \par Status post knee replacement.  Doing very well.  Physically active.  Back to [employer].  Will be due to recheck echocardiogram this year.  Follow-up after echo in 6 months. echocardiogram reviewed.  Results reviewed with the patient.  \par Blood pressure is slightly elevated.  Will increase the dose of lisinopril to 20 mg daily.  Continue blood pressure monitoring at home.  Low-sodium diet discussed with the patient.  We will recheck blood pressure in a couple of weeks and make further adjustments if necessary.

## 2022-02-21 NOTE — REASON FOR VISIT
· Will continue with Protonix  [Follow-Up - Clinic] : a clinic follow-up of [Hyperlipidemia] : hyperlipidemia [Hypertension] : hypertension [FreeTextEntry1] : bradycardia, s/p AVR

## 2022-02-21 NOTE — HISTORY OF PRESENT ILLNESS
[FreeTextEntry1] : This is a 76 yo female here today for  followup.\par S/P Ascending Aneurysm Repair #32 dacron graft #23 Aguayo Aortic Valve Replacement. Surgery Date: 10/21/19. PMH of HTN, HLD, bradycardia, nonobstructive CAD. \par No prior hx of MI or coronary revascularization. \par \par \par Past tests:\par Labs 12/2019, K 4.1, creat 0.61, LDL 78\par Carotid US 10/2019 mild nonobstructive\par Abd US 2013 mild nonobx plaque, no aneurysm\par 
,

## 2022-04-26 NOTE — CONSULT LETTER
Patient refusing PO challenge at this time, stated she did drink from her bottle        Isabela Renee, CHELE  04/26/22 0859 [Dear  ___] : Dear  [unfilled], [Consult Letter:] : I had the pleasure of evaluating your patient, [unfilled]. [Consult Closing:] : Thank you very much for allowing me to participate in the care of this patient.  If you have any questions, please do not hesitate to contact me. [Sincerely,] : Sincerely, [FreeTextEntry2] : Farrah Melendez MD [FreeTextEntry3] : Deng Herrera MD\par  of Cardiothoracic Surgery\par [facility]\par [address] [place] [address] \[place] [address]\par [phone]\par

## 2022-04-29 ENCOUNTER — APPOINTMENT (OUTPATIENT)
Dept: CT IMAGING | Facility: CLINIC | Age: 77
End: 2022-04-29
Payer: MEDICARE

## 2022-04-29 PROCEDURE — 71275 CT ANGIOGRAPHY CHEST: CPT | Mod: MH

## 2022-05-09 ENCOUNTER — APPOINTMENT (OUTPATIENT)
Dept: CARDIOLOGY | Facility: CLINIC | Age: 77
End: 2022-05-09
Payer: MEDICARE

## 2022-05-09 VITALS
TEMPERATURE: 98 F | HEIGHT: 62 IN | SYSTOLIC BLOOD PRESSURE: 130 MMHG | OXYGEN SATURATION: 98 % | DIASTOLIC BLOOD PRESSURE: 70 MMHG | WEIGHT: 118 LBS | HEART RATE: 58 BPM | BODY MASS INDEX: 21.71 KG/M2

## 2022-05-09 PROCEDURE — 99214 OFFICE O/P EST MOD 30 MIN: CPT

## 2022-05-09 RX ORDER — LISINOPRIL 20 MG/1
20 TABLET ORAL
Refills: 0 | Status: DISCONTINUED | COMMUNITY
End: 2022-05-09

## 2022-05-09 NOTE — ASSESSMENT
[FreeTextEntry1] : \par 1. S/P BP aortic valve replacement and aortic aneurysm repair 10/21/2019\par - Clinically doing well without evidence of volume overload\par \par \par 2. HTN\par - Reviewed goal < 130/80\par \par - Low salt diet\par \par 3. HLD - Cont simvastatin. Repeat lipids.\par \par 4. Nonobstrucitve CAD\par - Denies anginal complaint. Surveillance of LVEF and wall motion by echo. \par - Cont risk factor modification and medical rx - ASA, statin. Bradycardia limits use of BB.\par \par She was weaned off beta-blocker because of bradycardia.  She feels better.  Less fatigue.  Blood pressure is well controlled on present medications.  Patient's echocardiogram was done recently and was reviewed.  Aortic prosthesis is normally functioning.  Normal ejection fraction.\par Cardiac follow-up 6 months and as needed.\par \par Status post knee replacement.  Doing very well.  Physically active.  Back to [employer].  Will be due to recheck echocardiogram this year.  Follow-up after echo in 6 months. echocardiogram reviewed.  Results reviewed with the patient.  \par Blood pressure is slightly elevated.  Will increase the dose of lisinopril to 20 mg daily.  Continue blood pressure monitoring at home.  Low-sodium diet discussed with the patient.  We will recheck blood pressure in a couple of weeks and make further adjustments if necessary.

## 2022-05-09 NOTE — HISTORY OF PRESENT ILLNESS
[FreeTextEntry1] : This is a 77 yo female here today for  followup.\par S/P Ascending Aneurysm Repair #32 dacron graft #23 Aguayo Aortic Valve Replacement. Surgery Date: 10/21/19. PMH of HTN, HLD, bradycardia, nonobstructive CAD. \par No prior hx of MI or coronary revascularization. \par \par \par Past tests:\par Labs 12/2019, K 4.1, creat 0.61, LDL 78\par Carotid US 10/2019 mild nonobstructive\par Abd US 2013 mild nonobx plaque, no aneurysm\par

## 2022-06-09 NOTE — ASSESSMENT
[FreeTextEntry1] : This is a 73 yo female s/p  Ascending aortic replacement and AVR with Dr. Herrera on 10/21.   She is physically active. Her scar is healed. She denies any chest pains or shortness of breath. She is well controlled. We will refer to cardiac rehabilitation. Echocardiogram and exercise stress test from last time reviewed. Patient will start cardiac rehabilitation. P\par Near syncope secondary to dehydration. Continue vigorous hydration.\par Last visit the dose of metoprolol was reduced to half a pill twice a day.  She reports no significant symptoms of any dizziness lightheadedness chest pains or shortness of breath.\par ECG was reviewed.  Sinus bradycardia.  I recommend Holter monitor to rule out any significant bradycardia.  Otherwise we will refer her back to cardiac rehabilitation.  Blood pressure will be rechecked again in a week. home

## 2022-07-20 ENCOUNTER — RX RENEWAL (OUTPATIENT)
Age: 77
End: 2022-07-20

## 2022-11-16 ENCOUNTER — APPOINTMENT (OUTPATIENT)
Dept: CARDIOLOGY | Facility: CLINIC | Age: 77
End: 2022-11-16

## 2022-11-16 VITALS
SYSTOLIC BLOOD PRESSURE: 126 MMHG | WEIGHT: 115 LBS | DIASTOLIC BLOOD PRESSURE: 70 MMHG | HEIGHT: 62 IN | HEART RATE: 54 BPM | BODY MASS INDEX: 21.16 KG/M2 | OXYGEN SATURATION: 97 % | TEMPERATURE: 97.3 F

## 2022-11-16 PROCEDURE — 99214 OFFICE O/P EST MOD 30 MIN: CPT

## 2022-11-16 NOTE — HISTORY OF PRESENT ILLNESS
[FreeTextEntry1] : This is a 78 yo female here today for  followup.\par S/P Ascending Aneurysm Repair #32 dacron graft #23 Aguayo Aortic Valve Replacement. Surgery Date: 10/21/19. PMH of HTN, HLD, bradycardia, nonobstructive CAD. \par No prior hx of MI or coronary revascularization. \par \par \par Past tests:\par Labs 12/2019, K 4.1, creat 0.61, LDL 78\par Carotid US 10/2019 mild nonobstructive\par Abd US 2013 mild nonobx plaque, no aneurysm\par

## 2022-11-16 NOTE — ASSESSMENT
[FreeTextEntry1] : \par 1. S/P BP aortic valve replacement and aortic aneurysm repair 10/21/2019\par - Clinically doing well without evidence of volume overload\par \par \par 2. HTN\par - Reviewed goal < 130/80\par \par - Low salt diet\par \par 3. HLD - Cont simvastatin. Repeat lipids.\par \par 4. Nonobstrucitve CAD\par - Denies anginal complaint. Surveillance of LVEF and wall motion by echo. \par - Cont risk factor modification and medical rx - ASA, statin. Bradycardia limits use of BB.\par \par She was weaned off beta-blocker because of bradycardia.  She feels better.  Less fatigue.  Blood pressure is well controlled on present medications.\par Echocardiogram will be scheduled in 6 months to reevaluate ascending aorta and aortic valve.  Cardiac follow-up 6 months and as needed.\par \par Status post knee replacement.  Doing very well.  Physically active.  Back to golfing.  Will be due to recheck echocardiogram this year.  Follow-up after echo in 6 months.

## 2023-02-15 RX ORDER — LISINOPRIL 20 MG/1
20 TABLET ORAL DAILY
Qty: 90 | Refills: 3 | Status: ACTIVE | COMMUNITY
Start: 2022-02-21 | End: 1900-01-01

## 2023-04-17 ENCOUNTER — OFFICE (OUTPATIENT)
Dept: URBAN - METROPOLITAN AREA CLINIC 8 | Facility: CLINIC | Age: 78
Setting detail: OPHTHALMOLOGY
End: 2023-04-17
Payer: MEDICARE

## 2023-04-17 DIAGNOSIS — H40.1232: ICD-10-CM

## 2023-04-17 DIAGNOSIS — H16.223: ICD-10-CM

## 2023-04-17 DIAGNOSIS — H34.8322: ICD-10-CM

## 2023-04-17 DIAGNOSIS — Z96.1: ICD-10-CM

## 2023-04-17 PROCEDURE — 99213 OFFICE O/P EST LOW 20 MIN: CPT | Performed by: OPHTHALMOLOGY

## 2023-04-17 PROCEDURE — 92133 CPTRZD OPH DX IMG PST SGM ON: CPT | Performed by: OPHTHALMOLOGY

## 2023-04-17 ASSESSMENT — REFRACTION_CURRENTRX
OD_SPHERE: +2.50
OD_OVR_VA: 20/
OD_CYLINDER: SPH
OS_CYLINDER: SPH
OS_SPHERE: +2.50
OS_OVR_VA: 20/
OD_VPRISM_DIRECTION: SV
OS_VPRISM_DIRECTION: SV

## 2023-04-17 ASSESSMENT — REFRACTION_AUTOREFRACTION
OS_SPHERE: +0.25
OS_AXIS: 176
OS_CYLINDER: -0.75
OD_AXIS: 173
OD_CYLINDER: -1.25
OD_SPHERE: +0.25

## 2023-04-17 ASSESSMENT — REFRACTION_MANIFEST
OS_SPHERE: +0.25
OD_CYLINDER: -1.25
OD_ADD: +2.25
OS_VA2: 20/20(J1+)
OD_VA2: 20/20
OD_ADD: +2.75
OS_VA1: 20/30-2
OS_CYLINDER: -0.75
OS_SPHERE: +0.50
OS_VA2: 20/20
OD_AXIS: 175
OD_SPHERE: +0.75
OS_CYLINDER: -0.75
OD_CYLINDER: -1.00
OD_AXIS: 095
OS_ADD: +2.75
OD_VA1: 20/20
OS_AXIS: 180
OU_VA: 20/20-2
OD_SPHERE: +0.25
OD_VA1: 20/25-2
OS_ADD: +2.25
OD_VA2: 20/20(J1+)
OS_AXIS: 175
OS_VA1: 20/25-1

## 2023-04-17 ASSESSMENT — PACHYMETRY
OD_CT_CORRECTION: 6
OS_CT_UM: 445
OS_CT_CORRECTION: 7
OD_CT_UM: 455

## 2023-04-17 ASSESSMENT — TONOMETRY
OD_IOP_MMHG: 12
OS_IOP_MMHG: 11

## 2023-04-17 ASSESSMENT — VISUAL ACUITY
OS_BCVA: 20/20-2
OD_BCVA: 20/25+

## 2023-04-17 ASSESSMENT — KERATOMETRY
OD_K1POWER_DIOPTERS: 40.25
OD_AXISANGLE_DEGREES: 082
OS_K2POWER_DIOPTERS: 42.00
OD_K2POWER_DIOPTERS: 41.50
OS_AXISANGLE_DEGREES: 100
OS_K1POWER_DIOPTERS: 41.00
METHOD_AUTO_MANUAL: AUTO

## 2023-04-17 ASSESSMENT — AXIALLENGTH_DERIVED
OS_AL: 24.4021
OS_AL: 24.2984
OD_AL: 24.757
OS_AL: 24.4021
OD_AL: 24.757
OD_AL: 24.4917

## 2023-04-17 ASSESSMENT — SPHEQUIV_DERIVED
OS_SPHEQUIV: -0.125
OS_SPHEQUIV: 0.125
OS_SPHEQUIV: -0.125
OD_SPHEQUIV: -0.375
OD_SPHEQUIV: 0.25
OD_SPHEQUIV: -0.375

## 2023-04-17 ASSESSMENT — CONFRONTATIONAL VISUAL FIELD TEST (CVF)
OS_FINDINGS: FULL
OD_FINDINGS: FULL

## 2023-05-10 ENCOUNTER — APPOINTMENT (OUTPATIENT)
Dept: CARDIOLOGY | Facility: CLINIC | Age: 78
End: 2023-05-10
Payer: MEDICARE

## 2023-05-10 DIAGNOSIS — I34.0 NONRHEUMATIC MITRAL (VALVE) INSUFFICIENCY: ICD-10-CM

## 2023-05-16 NOTE — CARDIOLOGY SUMMARY
Pt wanted to know when he can start driving.  He has a post -op appointment on 4/27/17.  Advised it would be best to wait until his appointment to be evaluated by  and for him to give the okay to drive.  Verbalized understanding.    [___] : [unfilled] [___] : [unfilled]

## 2023-05-17 ENCOUNTER — APPOINTMENT (OUTPATIENT)
Dept: CARDIOLOGY | Facility: CLINIC | Age: 78
End: 2023-05-17
Payer: MEDICARE

## 2023-05-17 VITALS
BODY MASS INDEX: 20.61 KG/M2 | WEIGHT: 112 LBS | DIASTOLIC BLOOD PRESSURE: 72 MMHG | HEIGHT: 62 IN | SYSTOLIC BLOOD PRESSURE: 158 MMHG | OXYGEN SATURATION: 100 % | HEART RATE: 48 BPM

## 2023-05-17 PROCEDURE — 93000 ELECTROCARDIOGRAM COMPLETE: CPT

## 2023-05-17 PROCEDURE — 93306 TTE W/DOPPLER COMPLETE: CPT

## 2023-05-17 PROCEDURE — 99214 OFFICE O/P EST MOD 30 MIN: CPT

## 2023-05-17 NOTE — ASSESSMENT
[FreeTextEntry1] : \par 1. S/P BP aortic valve replacement and aortic aneurysm repair 10/21/2019\par - Clinically doing well without evidence of volume overload\par \par \par 2. HTN\par - Reviewed goal < 130/80\par \par - Low salt diet\par \par 3. HLD - Cont simvastatin. Repeat lipids.\par \par 4. Nonobstrucitve CAD\par - Denies anginal complaint. Surveillance of LVEF and wall motion by echo. \par - Cont risk factor modification and medical rx - ASA, statin. Bradycardia limits use of BB.\par \par She was weaned off beta-blocker because of bradycardia.  She feels better.  Less fatigue.  Blood pressure is well controlled on present medications.\par Echocardiogram will be scheduled in 6 months to reevaluate ascending aorta and aortic valve.  Cardiac follow-up 6 months and as needed.\par \par Status post knee replacement.  Doing very well.  Physically active.  Back to golfing.  Echocardiogram was done today.  Results reviewed.  No changes.  Continue present medications.  Blood pressure was rechecked.  128/80.  Continue blood pressure monitoring.  Cardiac follow-up 6 months and as needed.

## 2023-06-12 NOTE — PHYSICAL THERAPY INITIAL EVALUATION ADULT - LEVEL OF CONSCIOUSNESS, REHAB EVAL
Case Management Discharge Note      Final Note: Home with [facility]    Provided Post Acute Provider List?: N/A  Provided Post Acute Provider Quality & Resource List?: N/A    Selected Continued Care - Discharged on 6/10/2023 Admission date: 6/8/2023 - Discharge disposition: Home or Self Care    Destination    No services have been selected for the patient.              Durable Medical Equipment    No services have been selected for the patient.              Dialysis/Infusion    No services have been selected for the patient.              Home Medical Care Coordination complete.    Service Provider Selected Services Address Phone Fax Patient Preferred    [facility] [address] [address] [phone] --          Therapy    No services have been selected for the patient.              Community Resources    No services have been selected for the patient.              Community & DME    No services have been selected for the patient.                  Transportation Services  Private: Car    Final Discharge Disposition Code: 06 - home with home health care   alert

## 2023-07-18 RX ORDER — AMLODIPINE BESYLATE 5 MG/1
5 TABLET ORAL
Qty: 90 | Refills: 1 | Status: ACTIVE | COMMUNITY
Start: 2017-12-08 | End: 1900-01-01

## 2023-08-30 ENCOUNTER — RX RENEWAL (OUTPATIENT)
Age: 78
End: 2023-08-30

## 2023-08-30 RX ORDER — SIMVASTATIN 20 MG/1
20 TABLET, FILM COATED ORAL
Qty: 90 | Refills: 3 | Status: ACTIVE | COMMUNITY
Start: 2018-06-20 | End: 1900-01-01

## 2023-09-15 ASSESSMENT — KOOS JR
TWISING OR PIVOTING ON KNEE: SEVERE
TWISING OR PIVOTING ON KNEE: MILD
IMPORTED LATERALITY: RIGHT
TWISING OR PIVOTING ON KNEE: MODERATE
TWISING OR PIVOTING ON KNEE: MILD
HOW SEVERE IS YOUR KNEE STIFFNESS AFTER FIRST WAKING IN MORNING: MODERATE
KOOS JR RAW SCORE: 5
KOOS JR RAW SCORE: 5
KOOS JR RAW SCORE: 7
TWISING OR PIVOTING ON KNEE: MILD
HOW SEVERE IS YOUR KNEE STIFFNESS AFTER FIRST WAKING IN MORNING: MILD
STRAIGHTENING KNEE FULLY: MODERATE
BENDING TO THE FLOOR TO PICK UP OBJECT: MILD
GOING UP OR DOWN STAIRS: MODERATE
IMPORTED LATERALITY: RIGHT
IMPORTED KOOS JR SCORE: 68.28
BENDING TO THE FLOOR TO PICK UP OBJECT: MODERATE
TWISING OR PIVOTING ON KNEE: MILD
GOING UP OR DOWN STAIRS: MODERATE
GOING UP OR DOWN STAIRS: MODERATE
IMPORTED KOOS JR SCORE: 54.84
IMPORTED LATERALITY: RIGHT
KOOS JR RAW SCORE: 13
GOING UP OR DOWN STAIRS: SEVERE
GOING UP OR DOWN STAIRS: SEVERE
IMPORTED KOOS JR SCORE: 54.84
IMPORTED KOOS JR SCORE: 73.34
STRAIGHTENING KNEE FULLY: MODERATE
BENDING TO THE FLOOR TO PICK UP OBJECT: MILD
TWISING OR PIVOTING ON KNEE: SEVERE
BENDING TO THE FLOOR TO PICK UP OBJECT: MODERATE
IMPORTED LATERALITY: RIGHT
RISING FROM SITTING: MILD
IMPORTED FORM: YES
HOW SEVERE IS YOUR KNEE STIFFNESS AFTER FIRST WAKING IN MORNING: MILD
RISING FROM SITTING: MILD
BENDING TO THE FLOOR TO PICK UP OBJECT: MILD
HOW SEVERE IS YOUR KNEE STIFFNESS AFTER FIRST WAKING IN MORNING: SEVERE
RISING FROM SITTING: MILD
KOOS JR RAW SCORE: 13
IMPORTED FORM: YES
HOW SEVERE IS YOUR KNEE STIFFNESS AFTER FIRST WAKING IN MORNING: MODERATE
IMPORTED KOOS JR SCORE: 73.34
RISING FROM SITTING: MILD
IMPORTED KOOS JR SCORE: 68.28
IMPORTED FORM: YES
KOOS JR RAW SCORE: 7
TWISING OR PIVOTING ON KNEE: MODERATE
HOW SEVERE IS YOUR KNEE STIFFNESS AFTER FIRST WAKING IN MORNING: SEVERE
IMPORTED FORM: YES
GOING UP OR DOWN STAIRS: MODERATE
BENDING TO THE FLOOR TO PICK UP OBJECT: MILD

## 2023-10-11 ENCOUNTER — OFFICE (OUTPATIENT)
Dept: URBAN - METROPOLITAN AREA CLINIC 8 | Facility: CLINIC | Age: 78
Setting detail: OPHTHALMOLOGY
End: 2023-10-11
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
## 2023-10-11 PROCEDURE — NO SHOW FE NO SHOW FEE: Performed by: OPHTHALMOLOGY

## 2023-11-21 ENCOUNTER — APPOINTMENT (OUTPATIENT)
Dept: CARDIOLOGY | Facility: CLINIC | Age: 78
End: 2023-11-21
Payer: MEDICARE

## 2023-11-21 VITALS
DIASTOLIC BLOOD PRESSURE: 80 MMHG | OXYGEN SATURATION: 99 % | HEART RATE: 69 BPM | WEIGHT: 104 LBS | HEIGHT: 62 IN | SYSTOLIC BLOOD PRESSURE: 110 MMHG | BODY MASS INDEX: 19.14 KG/M2

## 2023-11-21 DIAGNOSIS — R00.1 BRADYCARDIA, UNSPECIFIED: ICD-10-CM

## 2023-11-21 DIAGNOSIS — I10 ESSENTIAL (PRIMARY) HYPERTENSION: ICD-10-CM

## 2023-11-21 DIAGNOSIS — I71.21 ANEURYSM OF THE ASCENDING AORTA, WITHOUT RUPTURE: ICD-10-CM

## 2023-11-21 DIAGNOSIS — Z95.4 PRESENCE OF OTHER HEART-VALVE REPLACEMENT: ICD-10-CM

## 2023-11-21 DIAGNOSIS — E78.5 HYPERLIPIDEMIA, UNSPECIFIED: ICD-10-CM

## 2023-11-21 PROCEDURE — 99213 OFFICE O/P EST LOW 20 MIN: CPT

## 2024-02-22 ENCOUNTER — OFFICE (OUTPATIENT)
Dept: URBAN - METROPOLITAN AREA CLINIC 8 | Facility: CLINIC | Age: 79
Setting detail: OPHTHALMOLOGY
End: 2024-02-22
Payer: MEDICARE

## 2024-02-22 VITALS — HEIGHT: 55 IN

## 2024-02-22 DIAGNOSIS — H16.223: ICD-10-CM

## 2024-02-22 DIAGNOSIS — H26.493: ICD-10-CM

## 2024-02-22 DIAGNOSIS — H34.8322: ICD-10-CM

## 2024-02-22 DIAGNOSIS — H40.1232: ICD-10-CM

## 2024-02-22 DIAGNOSIS — Z96.1: ICD-10-CM

## 2024-02-22 PROCEDURE — 92083 EXTENDED VISUAL FIELD XM: CPT | Performed by: OPHTHALMOLOGY

## 2024-02-22 PROCEDURE — 92014 COMPRE OPH EXAM EST PT 1/>: CPT | Performed by: OPHTHALMOLOGY

## 2024-02-22 ASSESSMENT — REFRACTION_MANIFEST
OD_CYLINDER: -1.00
OS_VA2: 20/20
OU_VA: 20/20-2
OS_SPHERE: +0.50
OS_ADD: +2.75
OD_SPHERE: +0.75
OS_CYLINDER: -0.75
OS_VA1: 20/25-1
OD_SPHERE: +0.25
OS_AXIS: 175
OD_VA1: 20/25-2
OS_VA1: 20/30-2
OD_AXIS: 175
OS_CYLINDER: -0.75
OD_VA1: 20/20
OS_SPHERE: +0.25
OS_VA2: 20/20(J1+)
OD_ADD: +2.25
OD_AXIS: 095
OS_ADD: +2.25
OD_CYLINDER: -1.25
OD_VA2: 20/20
OD_VA2: 20/20(J1+)
OS_AXIS: 180
OD_ADD: +2.75

## 2024-02-22 ASSESSMENT — REFRACTION_CURRENTRX
OD_OVR_VA: 20/
OS_SPHERE: +2.50
OD_VPRISM_DIRECTION: SV
OS_OVR_VA: 20/
OS_VPRISM_DIRECTION: SV
OD_CYLINDER: SPH
OS_CYLINDER: SPH
OD_SPHERE: +2.50

## 2024-02-22 ASSESSMENT — SPHEQUIV_DERIVED
OS_SPHEQUIV: 0.125
OD_SPHEQUIV: 0.25
OS_SPHEQUIV: -0.125
OD_SPHEQUIV: -0.375

## 2024-02-22 ASSESSMENT — REFRACTION_AUTOREFRACTION
OS_SPHERE: PLANO
OS_CYLINDER: -0.50
OS_AXIS: 016
OD_CYLINDER: -0.50
OD_AXIS: 156
OD_SPHERE: PLANO

## 2024-02-22 ASSESSMENT — CONFRONTATIONAL VISUAL FIELD TEST (CVF)
OD_FINDINGS: FULL
OS_FINDINGS: FULL

## 2024-03-07 ENCOUNTER — RX ONLY (RX ONLY)
Age: 79
End: 2024-03-07

## 2024-03-07 ENCOUNTER — OFFICE (OUTPATIENT)
Dept: URBAN - METROPOLITAN AREA CLINIC 8 | Facility: CLINIC | Age: 79
Setting detail: OPHTHALMOLOGY
End: 2024-03-07
Payer: MEDICARE

## 2024-03-07 DIAGNOSIS — H40.1232: ICD-10-CM

## 2024-03-07 PROCEDURE — 99213 OFFICE O/P EST LOW 20 MIN: CPT | Performed by: OPHTHALMOLOGY

## 2024-03-07 ASSESSMENT — REFRACTION_MANIFEST
OD_VA1: 20/20
OS_SPHERE: +0.50
OD_ADD: +2.75
OD_SPHERE: +0.25
OD_CYLINDER: -1.00
OD_VA2: 20/20
OS_VA1: 20/25-1
OD_AXIS: 175
OS_AXIS: 175
OS_VA1: 20/30-2
OS_CYLINDER: -0.75
OS_AXIS: 180
OD_VA2: 20/20(J1+)
OS_VA2: 20/20(J1+)
OS_ADD: +2.75
OD_VA1: 20/25-2
OD_SPHERE: +0.75
OS_SPHERE: +0.25
OU_VA: 20/20-2
OD_ADD: +2.25
OD_AXIS: 095
OS_ADD: +2.25
OD_CYLINDER: -1.25
OS_CYLINDER: -0.75
OS_VA2: 20/20

## 2024-03-07 ASSESSMENT — REFRACTION_CURRENTRX
OS_SPHERE: +2.50
OS_OVR_VA: 20/
OD_CYLINDER: SPH
OD_OVR_VA: 20/
OD_SPHERE: +2.50
OD_VPRISM_DIRECTION: SV
OS_VPRISM_DIRECTION: SV
OS_CYLINDER: SPH

## 2024-03-07 ASSESSMENT — SPHEQUIV_DERIVED
OS_SPHEQUIV: -0.125
OD_SPHEQUIV: -0.375
OD_SPHEQUIV: 0.25
OS_SPHEQUIV: 0.125

## 2024-05-19 ENCOUNTER — TRANSCRIPTION ENCOUNTER (OUTPATIENT)
Age: 79
End: 2024-05-19

## 2024-05-20 ENCOUNTER — TRANSCRIPTION ENCOUNTER (OUTPATIENT)
Age: 79
End: 2024-05-20

## 2024-05-23 ENCOUNTER — OFFICE (OUTPATIENT)
Dept: URBAN - METROPOLITAN AREA CLINIC 8 | Facility: CLINIC | Age: 79
Setting detail: OPHTHALMOLOGY
End: 2024-05-23

## 2024-05-23 ENCOUNTER — APPOINTMENT (OUTPATIENT)
Dept: CARDIOLOGY | Facility: CLINIC | Age: 79
End: 2024-05-23
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
## 2024-05-23 PROCEDURE — NO SHOW FE NO SHOW FEE: Performed by: OPHTHALMOLOGY

## 2024-05-30 ENCOUNTER — APPOINTMENT (OUTPATIENT)
Dept: CARDIOLOGY | Facility: CLINIC | Age: 79
End: 2024-05-30

## 2025-07-01 NOTE — PHYSICAL THERAPY INITIAL EVALUATION ADULT - LEVEL OF INDEPENDENCE: SUPINE/SIT, REHAB EVAL
unable to perform/pt oob in chair
In order to check the location, date, or time of your aftercare appointment, please refer to your Discharge Instructions Document given to you upon leaving the hospital.  If you have lost the instructions please call [phone]